# Patient Record
Sex: MALE | Race: WHITE | NOT HISPANIC OR LATINO | Employment: OTHER | URBAN - METROPOLITAN AREA
[De-identification: names, ages, dates, MRNs, and addresses within clinical notes are randomized per-mention and may not be internally consistent; named-entity substitution may affect disease eponyms.]

---

## 2019-10-15 ENCOUNTER — OFFICE VISIT (OUTPATIENT)
Dept: URGENT CARE | Facility: CLINIC | Age: 29
End: 2019-10-15
Payer: COMMERCIAL

## 2019-10-15 VITALS
TEMPERATURE: 97.4 F | WEIGHT: 194 LBS | DIASTOLIC BLOOD PRESSURE: 84 MMHG | BODY MASS INDEX: 26.28 KG/M2 | OXYGEN SATURATION: 99 % | RESPIRATION RATE: 16 BRPM | SYSTOLIC BLOOD PRESSURE: 126 MMHG | HEIGHT: 72 IN | HEART RATE: 71 BPM

## 2019-10-15 DIAGNOSIS — M62.838 MUSCLE SPASMS OF NECK: ICD-10-CM

## 2019-10-15 DIAGNOSIS — M54.2 NECK PAIN: ICD-10-CM

## 2019-10-15 PROCEDURE — 99204 OFFICE O/P NEW MOD 45 MIN: CPT | Performed by: PHYSICIAN ASSISTANT

## 2019-10-15 RX ORDER — KETOROLAC TROMETHAMINE 30 MG/ML
30 INJECTION, SOLUTION INTRAMUSCULAR; INTRAVENOUS ONCE
Status: COMPLETED | OUTPATIENT
Start: 2019-10-15 | End: 2019-10-15

## 2019-10-15 RX ORDER — CYCLOBENZAPRINE HCL 5 MG
5-10 TABLET ORAL NIGHTLY PRN
Qty: 15 TAB | Refills: 0 | Status: SHIPPED
Start: 2019-10-15 | End: 2020-05-06

## 2019-10-15 RX ADMIN — KETOROLAC TROMETHAMINE 30 MG: 30 INJECTION, SOLUTION INTRAMUSCULAR; INTRAVENOUS at 09:18

## 2019-10-15 ASSESSMENT — ENCOUNTER SYMPTOMS
HEADACHES: 0
NUMBNESS: 0
NECK PAIN: 1
SHORTNESS OF BREATH: 0
FEVER: 0
TINGLING: 0
SORE THROAT: 0
VOMITING: 0
COUGH: 0
EYE REDNESS: 0
NAUSEA: 0
TROUBLE SWALLOWING: 0
VISUAL CHANGE: 0
EYE DISCHARGE: 0
WEAKNESS: 0
ABDOMINAL PAIN: 0

## 2019-10-15 NOTE — PROGRESS NOTES
Subjective:      Marquise Peacock is a 29 y.o. male who presents with Neck Pain (x2 days, (R) side neck pain, limited ROm with pain. no known injury )        Neck Pain    This is a new problem. Episode onset: x 2 days. The problem occurs constantly. The problem has been gradually worsening. The pain is associated with nothing. The pain is present in the right side. The quality of the pain is described as shooting (and sharp). The pain is moderate. The symptoms are aggravated by position and twisting. The pain is same all the time. Pertinent negatives include no chest pain, fever, headaches, numbness, pain with swallowing, tingling, trouble swallowing, visual change or weakness. He has tried heat and acetaminophen for the symptoms. The treatment provided mild relief.     The patient denies injury or trauma to his neck. The patient states his neck pain is worse with movement, especially turning his head from side to side. The patient denies headache and fever. No numbness, tingling, or weakness.    PMH:  has no past medical history on file.  MEDS: No current outpatient medications on file.  ALLERGIES: No Known Allergies  SURGHX: History reviewed. No pertinent surgical history.  SOCHX:  reports that he has never smoked. He has never used smokeless tobacco. He reports that he drank alcohol. He reports that he does not use drugs.  FH: Family history was reviewed, no pertinent findings to report    Review of Systems   Constitutional: Negative for fever.   HENT: Negative for congestion, ear pain, sore throat and trouble swallowing.    Eyes: Negative for discharge and redness.   Respiratory: Negative for cough and shortness of breath.    Cardiovascular: Negative for chest pain and leg swelling.   Gastrointestinal: Negative for abdominal pain, nausea and vomiting.   Musculoskeletal: Positive for neck pain.   Skin: Negative for rash.   Neurological: Negative for tingling, weakness, numbness and headaches.   All other systems  reviewed and are negative.         Objective:     /84   Pulse 71   Temp 36.3 °C (97.4 °F) (Temporal)   Resp 16   Ht 1.829 m (6')   Wt 88 kg (194 lb)   SpO2 99%   BMI 26.31 kg/m²      Physical Exam   Constitutional: He is oriented to person, place, and time. He appears well-developed and well-nourished. No distress.   HENT:   Head: Normocephalic and atraumatic.   Right Ear: External ear normal.   Left Ear: External ear normal.   Nose: Nose normal.   Eyes: Conjunctivae and EOM are normal.   Neck: Neck supple. Muscular tenderness present. No spinous process tenderness present. No neck rigidity. Decreased range of motion present.   Cervical Spine:  Right paraspinal muscle tenderness to the cervical spine with associated muscle spasm extending over the trapezius muscle. No midline/bony tenderness.  Decreased ROM - secondary to pain  No rigidity  Neurovascular intact   Strength 5/5 - equal bilateral upper extremities   Strength 5/5  Intrinsic muscles intact   Cardiovascular: Normal rate.   Pulmonary/Chest: Effort normal.   Neurological: He is alert and oriented to person, place, and time.   Skin: Skin is warm and dry.          Progress:  Toradol 30mg IM given in clinic.   The patient reports approximately 10% improvement of his symptoms after the Toradol injection.    The patient is requesting a referral to a specialist for his current symptoms.  Will refer the patient to sports medicine for further evaluation of his current symptoms.     Assessment/Plan:     1. Neck pain  - ketorolac (TORADOL) injection 30 mg  - REFERRAL TO SPORTS MEDICINE    2. Muscle spasms of neck  - cyclobenzaprine (FLEXERIL) 5 MG tablet; Take 1-2 Tabs by mouth at bedtime as needed for Muscle Spasms.  Dispense: 15 Tab; Refill: 0  - REFERRAL TO SPORTS MEDICINE    The patient's presenting symptoms and physical exam are consistent with right-sided neck pain with associated muscle spasm.  Given the patient did not sustain any injury or  trauma to his neck, and the presence of no midline/bony tenderness on physical exam, it is unlikely the patient's symptoms are due to an acute bony process.  On physical exam, the patient had right paraspinal tenderness to the cervical spine with associated muscle spasm extending over the trapezius muscle.  This is likely the cause of the patient's symptoms.  Given the patient is currently not experiencing neck stiffness, radiation of pain, numbness, tingling, or weakness to his extremities, headache, or fever, the presence of no focal neurological deficits on physical exam, it is unlikely the patient's symptoms are due to an acute neurological/spinal cord process.  The patient was given Toradol 30 mg IM today in clinic for his current symptoms.  Will prescribe the patient Flexeril for symptom medic relief of his acute muscle spasm.  Will also refer the patient to sports medicine, as he is requesting a referral to a specialist for his current symptoms.  Recommend OTC medications and supportive care for symptomatic management.  Recommend patient follow-up with his PCP.  Discussed return precautions with the patient, he verbalized understanding    Differential diagnoses, supportive care, and indications for immediate follow-up discussed with patient.   Instructed to return to clinic or nearest emergency department for any change in condition, further concerns, or worsening of symptoms.    OTC NSAIDs for pain/discomfort  Alternate ice and heat to the affected area for symptomatic relief  Home stretches as discussed in clinic  Referral to sports medicine  Follow-up with PCP  AVS printed  Return to clinic or go to the ED if symptoms worsen or fail to improve, or if the patient should develop worsening/increasing neck pain, radiation of pain, numbness, tingling, or weakness to his extremities, paresthesias, neck stiffness/rigidity, headache, fever/chills, and/or any concerning symptoms.     Discussed plan with the  patient, and he agrees to the above.

## 2019-10-15 NOTE — PATIENT INSTRUCTIONS
Cervical Strain and Sprain Rehab  Ask your health care provider which exercises are safe for you. Do exercises exactly as told by your health care provider and adjust them as directed. It is normal to feel mild stretching, pulling, tightness, or discomfort as you do these exercises, but you should stop right away if you feel sudden pain or your pain gets worse. Do not begin these exercises until told by your health care provider.  Stretching and range of motion exercises  These exercises warm up your muscles and joints and improve the movement and flexibility of your neck. These exercises also help to relieve pain, numbness, and tingling.  Exercise A: Cervical side bend  1. Using good posture, sit on a stable chair or stand up.  2. Without moving your shoulders, slowly tilt your left / right ear to your shoulder until you feel a stretch in your neck muscles. You should be looking straight ahead.  3. Hold for __________ seconds.  4. Repeat with the other side of your neck.  Repeat __________ times. Complete this exercise __________ times a day.  Exercise B: Cervical rotation  1. Using good posture, sit on a stable chair or stand up.  2. Slowly turn your head to the side as if you are looking over your left / right shoulder.  ¨ Keep your eyes level with the ground.  ¨ Stop when you feel a stretch along the side and the back of your neck.  3. Hold for __________ seconds.  4. Repeat this by turning to your other side.  Repeat __________ times. Complete this exercise __________ times a day.  Exercise C: Thoracic extension and pectoral stretch  1. Roll a towel or a small blanket so it is about 4 inches (10 cm) in diameter.  2. Lie down on your back on a firm surface.  3. Put the towel lengthwise, under your spine in the middle of your back. It should not be not under your shoulder blades. The towel should line up with your spine from your middle back to your lower back.  4. Put your hands behind your head and let your  elbows fall out to your sides.  5. Hold for __________ seconds.  Repeat __________ times. Complete this exercise __________ times a day.  Strengthening exercises  These exercises build strength and endurance in your neck. Endurance is the ability to use your muscles for a long time, even after your muscles get tired.  Exercise D: Upper cervical flexion, isometric  1. Lie on your back with a thin pillow behind your head and a small rolled-up towel under your neck.  2. Gently tuck your chin toward your chest and nod your head down to look toward your feet. Do not lift your head off the pillow.  3. Hold for __________ seconds.  4. Release the tension slowly. Relax your neck muscles completely before you repeat this exercise.  Repeat __________ times. Complete this exercise __________ times a day.  Exercise E: Cervical extension, isometric  1. Stand about 6 inches (15 cm) away from a wall, with your back facing the wall.  2. Place a soft object, about 6-8 inches (15-20 cm) in diameter, between the back of your head and the wall. A soft object could be a small pillow, a ball, or a folded towel.  3. Gently tilt your head back and press into the soft object. Keep your jaw and forehead relaxed.  4. Hold for __________ seconds.  5. Release the tension slowly. Relax your neck muscles completely before you repeat this exercise.  Repeat __________ times. Complete this exercise __________ times a day.  Posture and body mechanics     Body mechanics refers to the movements and positions of your body while you do your daily activities. Posture is part of body mechanics. Good posture and healthy body mechanics can help to relieve stress in your body's tissues and joints. Good posture means that your spine is in its natural S-curve position (your spine is neutral), your shoulders are pulled back slightly, and your head is not tipped forward. The following are general guidelines for applying improved posture and body mechanics to your  everyday activities.  Standing  · When standing, keep your spine neutral and keep your feet about hip-width apart. Keep a slight bend in your knees. Your ears, shoulders, and hips should line up.  · When you do a task in which you  one place for a long time, place one foot up on a stable object that is 2-4 inches (5-10 cm) high, such as a footstool. This helps keep your spine neutral.  Sitting  · When sitting, keep your spine neutral and your keep feet flat on the floor. Use a footrest, if necessary, and keep your thighs parallel to the floor. Avoid rounding your shoulders, and avoid tilting your head forward.  · When working at a desk or a computer, keep your desk at a height where your hands are slightly lower than your elbows. Slide your chair under your desk so you are close enough to maintain good posture.  · When working at a computer, place your monitor at a height where you are looking straight ahead and you do not have to tilt your head forward or downward to look at the screen.  Resting  When lying down and resting, avoid positions that are most painful for you. Try to support your neck in a neutral position. You can use a contour pillow or a small rolled-up towel. Your pillow should support your neck but not push on it.  This information is not intended to replace advice given to you by your health care provider. Make sure you discuss any questions you have with your health care provider.  Document Released: 12/18/2006 Document Revised: 08/24/2017 Document Reviewed: 11/23/2016  [employer] Interactive Patient Education © 2017 [employer]

## 2019-10-21 ENCOUNTER — HOSPITAL ENCOUNTER (OUTPATIENT)
Dept: LAB | Facility: MEDICAL CENTER | Age: 29
End: 2019-10-21
Attending: STUDENT IN AN ORGANIZED HEALTH CARE EDUCATION/TRAINING PROGRAM
Payer: COMMERCIAL

## 2019-10-21 LAB
BASOPHILS # BLD AUTO: 0.3 % (ref 0–1.8)
BASOPHILS # BLD: 0.03 K/UL (ref 0–0.12)
CREAT UR-MCNC: 217.8 MG/DL
EOSINOPHIL # BLD AUTO: 0.02 K/UL (ref 0–0.51)
EOSINOPHIL NFR BLD: 0.2 % (ref 0–6.9)
ERYTHROCYTE [DISTWIDTH] IN BLOOD BY AUTOMATED COUNT: 41.4 FL (ref 35.9–50)
HCT VFR BLD AUTO: 47.2 % (ref 42–52)
HGB BLD-MCNC: 15.5 G/DL (ref 14–18)
IMM GRANULOCYTES # BLD AUTO: 0.03 K/UL (ref 0–0.11)
IMM GRANULOCYTES NFR BLD AUTO: 0.3 % (ref 0–0.9)
LYMPHOCYTES # BLD AUTO: 2.07 K/UL (ref 1–4.8)
LYMPHOCYTES NFR BLD: 22.5 % (ref 22–41)
MCH RBC QN AUTO: 31.2 PG (ref 27–33)
MCHC RBC AUTO-ENTMCNC: 32.8 G/DL (ref 33.7–35.3)
MCV RBC AUTO: 95 FL (ref 81.4–97.8)
MICROALBUMIN UR-MCNC: 1.3 MG/DL
MICROALBUMIN/CREAT UR: 6 MG/G (ref 0–30)
MONOCYTES # BLD AUTO: 0.69 K/UL (ref 0–0.85)
MONOCYTES NFR BLD AUTO: 7.5 % (ref 0–13.4)
NEUTROPHILS # BLD AUTO: 6.36 K/UL (ref 1.82–7.42)
NEUTROPHILS NFR BLD: 69.2 % (ref 44–72)
NRBC # BLD AUTO: 0 K/UL
NRBC BLD-RTO: 0 /100 WBC
PLATELET # BLD AUTO: 281 K/UL (ref 164–446)
PMV BLD AUTO: 10.3 FL (ref 9–12.9)
RBC # BLD AUTO: 4.97 M/UL (ref 4.7–6.1)
WBC # BLD AUTO: 9.2 K/UL (ref 4.8–10.8)

## 2019-10-21 PROCEDURE — 80053 COMPREHEN METABOLIC PANEL: CPT

## 2019-10-21 PROCEDURE — 83036 HEMOGLOBIN GLYCOSYLATED A1C: CPT

## 2019-10-21 PROCEDURE — 82570 ASSAY OF URINE CREATININE: CPT

## 2019-10-21 PROCEDURE — 36415 COLL VENOUS BLD VENIPUNCTURE: CPT

## 2019-10-21 PROCEDURE — 85025 COMPLETE CBC W/AUTO DIFF WBC: CPT

## 2019-10-21 PROCEDURE — 82043 UR ALBUMIN QUANTITATIVE: CPT

## 2019-10-21 PROCEDURE — 80061 LIPID PANEL: CPT

## 2019-10-22 LAB
ALBUMIN SERPL BCP-MCNC: 4.3 G/DL (ref 3.2–4.9)
ALBUMIN/GLOB SERPL: 1.6 G/DL
ALP SERPL-CCNC: 74 U/L (ref 30–99)
ALT SERPL-CCNC: 12 U/L (ref 2–50)
ANION GAP SERPL CALC-SCNC: 7 MMOL/L (ref 0–11.9)
AST SERPL-CCNC: 16 U/L (ref 12–45)
BILIRUB SERPL-MCNC: 0.7 MG/DL (ref 0.1–1.5)
BUN SERPL-MCNC: 20 MG/DL (ref 8–22)
CALCIUM SERPL-MCNC: 9.3 MG/DL (ref 8.5–10.5)
CHLORIDE SERPL-SCNC: 106 MMOL/L (ref 96–112)
CHOLEST SERPL-MCNC: 186 MG/DL (ref 100–199)
CO2 SERPL-SCNC: 28 MMOL/L (ref 20–33)
CREAT SERPL-MCNC: 0.71 MG/DL (ref 0.5–1.4)
EST. AVERAGE GLUCOSE BLD GHB EST-MCNC: 154 MG/DL
GLOBULIN SER CALC-MCNC: 2.7 G/DL (ref 1.9–3.5)
GLUCOSE SERPL-MCNC: 54 MG/DL (ref 65–99)
HBA1C MFR BLD: 7 % (ref 0–5.6)
HDLC SERPL-MCNC: 57 MG/DL
LDLC SERPL CALC-MCNC: 121 MG/DL
POTASSIUM SERPL-SCNC: 3.9 MMOL/L (ref 3.6–5.5)
PROT SERPL-MCNC: 7 G/DL (ref 6–8.2)
SODIUM SERPL-SCNC: 141 MMOL/L (ref 135–145)
TRIGL SERPL-MCNC: 40 MG/DL (ref 0–149)

## 2019-10-27 ENCOUNTER — HOSPITAL ENCOUNTER (OUTPATIENT)
Dept: RADIOLOGY | Facility: MEDICAL CENTER | Age: 29
End: 2019-10-27
Attending: NEUROLOGICAL SURGERY
Payer: COMMERCIAL

## 2019-10-27 DIAGNOSIS — M50.30 OTHER CERVICAL DISC DEGENERATION, UNSPECIFIED CERVICAL REGION: ICD-10-CM

## 2019-10-27 PROCEDURE — 72141 MRI NECK SPINE W/O DYE: CPT

## 2020-01-07 ENCOUNTER — HOSPITAL ENCOUNTER (OUTPATIENT)
Dept: RADIOLOGY | Facility: MEDICAL CENTER | Age: 30
End: 2020-01-07
Attending: NEUROLOGICAL SURGERY
Payer: COMMERCIAL

## 2020-01-07 DIAGNOSIS — M50.20 DISPLACEMENT OF CERVICAL INTERVERTEBRAL DISC WITHOUT MYELOPATHY: ICD-10-CM

## 2020-01-07 DIAGNOSIS — M51.34 DEGENERATION OF THORACIC INTERVERTEBRAL DISC: ICD-10-CM

## 2020-01-07 PROCEDURE — 72141 MRI NECK SPINE W/O DYE: CPT

## 2020-01-07 PROCEDURE — 72146 MRI CHEST SPINE W/O DYE: CPT

## 2020-05-06 ENCOUNTER — HOSPITAL ENCOUNTER (EMERGENCY)
Facility: MEDICAL CENTER | Age: 30
End: 2020-05-06
Attending: EMERGENCY MEDICINE
Payer: COMMERCIAL

## 2020-05-06 VITALS
HEART RATE: 86 BPM | HEIGHT: 70 IN | RESPIRATION RATE: 15 BRPM | TEMPERATURE: 98 F | SYSTOLIC BLOOD PRESSURE: 127 MMHG | DIASTOLIC BLOOD PRESSURE: 61 MMHG | WEIGHT: 194 LBS | OXYGEN SATURATION: 96 % | BODY MASS INDEX: 27.77 KG/M2

## 2020-05-06 DIAGNOSIS — E16.2 HYPOGLYCEMIA: ICD-10-CM

## 2020-05-06 LAB — GLUCOSE BLD-MCNC: 84 MG/DL (ref 65–99)

## 2020-05-06 PROCEDURE — 99282 EMERGENCY DEPT VISIT SF MDM: CPT

## 2020-05-06 PROCEDURE — 82962 GLUCOSE BLOOD TEST: CPT

## 2020-05-06 PROCEDURE — 36415 COLL VENOUS BLD VENIPUNCTURE: CPT

## 2020-05-06 RX ORDER — INSULIN GLARGINE 300 U/ML
50 INJECTION, SOLUTION SUBCUTANEOUS
COMMUNITY
End: 2021-07-06 | Stop reason: SDUPTHER

## 2020-05-06 ASSESSMENT — FIBROSIS 4 INDEX: FIB4 SCORE: 0.46

## 2020-05-06 ASSESSMENT — LIFESTYLE VARIABLES
HAVE YOU EVER FELT YOU SHOULD CUT DOWN ON YOUR DRINKING: NO
EVER FELT BAD OR GUILTY ABOUT YOUR DRINKING: NO
TOTAL SCORE: 0
ON A TYPICAL DAY WHEN YOU DRINK ALCOHOL HOW MANY DRINKS DO YOU HAVE: 0
DO YOU DRINK ALCOHOL: NO
DOES PATIENT WANT TO STOP DRINKING: NO
EVER HAD A DRINK FIRST THING IN THE MORNING TO STEADY YOUR NERVES TO GET RID OF A HANGOVER: NO
TOTAL SCORE: 0
CONSUMPTION TOTAL: NEGATIVE
HOW MANY TIMES IN THE PAST YEAR HAVE YOU HAD 5 OR MORE DRINKS IN A DAY: 0
HAVE PEOPLE ANNOYED YOU BY CRITICIZING YOUR DRINKING: NO
AVERAGE NUMBER OF DAYS PER WEEK YOU HAVE A DRINK CONTAINING ALCOHOL: 0
TOTAL SCORE: 0

## 2020-05-06 NOTE — ED PROVIDER NOTES
ED Provider Note    CHIEF COMPLAINT  Chief Complaint   Patient presents with   • Hypoglycemia     Pt presents to the ED [facility] complaints of low blood sugar. Pt states he accidently took 45 units of his long acting insulin earlier this eveining. pt states he began to notice a quick drop in his blood sugar and has called [facility] twice. Pt states he has recieved 2 bags of D10 but is continuing to notice a drop. Pt states he feels intermittitly lighheaded. BS in Triage 84       HPI  Marquise Peacock is a 29 y.o. male with a history of type 1 diabetes mellitus who presents with a chief complaint of hypoglycemia.  The patient notes he was in his baseline state of health until he accidentally gave himself 45 units of his short acting insulin, Apidra, at approximately 10:30 PM.  He realized his mistake immediately and checked his blood sugar noting at that time that it had dropped precipitously.  He called [facility] and they checked his blood glucose and gave him D10.  They left but he called them once again 10 minutes later when his blood sugar began to drop and they gave him a second bag of D10 and brought him to the ER.  He denies any current symptoms.  He has a continuous blood sugar monitor and notes that it has been at approximately 82 for the past 10 minutes which is good for him.  He notes that he was not trying to harm himself.    REVIEW OF SYSTEMS  See [facility] for further details.  Hyperglycemia all other systems are negative.     PAST MEDICAL HISTORY   has a past medical history of Diabetes (HCC).    SOCIAL HISTORY  Social History     Tobacco Use   • Smoking status: Never Smoker   • Smokeless tobacco: Never Used   Substance and Sexual Activity   • Alcohol use: Not Currently   • Drug use: Never   • Sexual activity: Not on file       SURGICAL HISTORY  patient denies any surgical history    CURRENT MEDICATIONS  Home Medications     Reviewed by Ting Humphreys R.N. (Registered Nurse) on 05/06/20 at 0043  Med List Status:  "Partial   Medication Last Dose Status   Insulin Aspart (NOVOLOG SC) 5/6/2020 Active   Insulin Glargine, 2 Unit Dial, (TOUJEO MAX SOLOSTAR) 300 UNIT/ML Solution Pen-injector 5/6/2020 Active                ALLERGIES  No Known Allergies    PHYSICAL EXAM  VITAL SIGNS: /78   Pulse 90   Temp 36.7 °C (98 °F) (Temporal)   Resp 12   Ht 1.778 m (5' 10\")   Wt 88 kg (194 lb)   SpO2 97%   BMI 27.84 kg/m²    Pulse ox interpretation: I interpret this pulse ox as normal.  Constitutional: Alert in no apparent distress.  HENT: No signs of trauma, Bilateral external ears normal, Nose normal.  Moist mucous membranes.  Eyes: Pupils are equal and reactive, Conjunctiva normal, Non-icteric.   Neck: Normal range of motion, No tenderness, Supple, No stridor.   Lymphatic: No lymphadenopathy noted.   Cardiovascular: Regular rate and rhythm, no murmurs.   Thorax & Lungs: Normal breath sounds, No respiratory distress, No wheezing, No chest tenderness.   Abdomen: Bowel sounds normal, Soft, No tenderness, No masses, No pulsatile masses. No peritoneal signs.  Skin: Warm, Dry, No erythema, No rash.   Back: Normal alignment.  Extremities: Intact distal pulses, No edema, No tenderness, No cyanosis.  Musculoskeletal: Good range of motion in all major joints. No tenderness to palpation or major deformities noted.   Neurologic: Alert, Normal motor function, Normal sensory function, No focal deficits noted.   Psychiatric: Affect normal, Judgment normal, Mood normal.     DIAGNOSTIC STUDIES / PROCEDURES    COURSE & MEDICAL DECISION MAKING  Pertinent Labs & Imaging studies reviewed. (See chart for details)  This is a 29-year-old male with a history of type 1 diabetes mellitus who is here after accidentally mixing up his short and long-term insulin and giving himself too much short acting insulin.  He gave this to himself at approximately 10:00 PM.  I did speak with our pharmacist and it appears the peak action of the medication is at around 2.8 " hours.  He is currently asymptomatic.    Patient was observed until approximately 130, 3-1/2 hours after accidentally giving himself the short acting insulin.  His blood glucose was at approximately 90.  He continued to be asymptomatic.  He is safe for discharge with close outpatient management.    The patient will not drink alcohol nor drive with prescribed medications. The patient will return for worsening symptoms and is stable at the time of discharge. The patient verbalizes understanding and will comply.    FINAL IMPRESSION  1.  Hypoglycemia    Electronically signed by: Jacky Phan M.D., 5/6/2020 1:12 AM

## 2020-05-06 NOTE — DISCHARGE INSTRUCTIONS
You were seen in the ER after accidentally taking too much of your short acting insulin.  You were monitored for several hours and your blood glucose has remained in a normal range.  You are now safe to go.  Follow-up with your primary care physician within 48 hours for recheck.  Return to the ER at anytime with new or worsening symptoms.

## 2020-05-06 NOTE — ED NOTES
Patient states that he has been 90 for the last 45 min and he feels good about going home. Notified ERP

## 2020-05-06 NOTE — ED TRIAGE NOTES
Marquise Peacock  29 y.o. male  Chief Complaint   Patient presents with   • Hypoglycemia     Pt presents to the ED [facility] complaints of low blood sugar. Pt states he accidently took 45 units of his long acting insulin earlier this eveining. pt states he began to notice a quick drop in his blood sugar and has called [facility] twice. Pt states he has recieved 2 bags of D10 but is continuing to notice a drop. Pt states he feels intermittitly lighheaded. BS in Triage 84     Pt roomed to RD3; Report given to Ting ARDON.

## 2020-05-06 NOTE — ED NOTES
Patient ambulated back to room, took 45 units novolog at approximately 2230, called EMS immediately, was given 2 bags if D10. Has a continuous BS monitor on and it is reading 84.

## 2020-08-08 ENCOUNTER — OFFICE VISIT (OUTPATIENT)
Dept: URGENT CARE | Facility: CLINIC | Age: 30
End: 2020-08-08
Payer: COMMERCIAL

## 2020-08-08 ENCOUNTER — HOSPITAL ENCOUNTER (OUTPATIENT)
Facility: MEDICAL CENTER | Age: 30
End: 2020-08-08
Attending: NURSE PRACTITIONER
Payer: COMMERCIAL

## 2020-08-08 VITALS
SYSTOLIC BLOOD PRESSURE: 122 MMHG | WEIGHT: 195 LBS | HEART RATE: 83 BPM | BODY MASS INDEX: 27.3 KG/M2 | TEMPERATURE: 99.9 F | OXYGEN SATURATION: 95 % | HEIGHT: 71 IN | DIASTOLIC BLOOD PRESSURE: 90 MMHG

## 2020-08-08 DIAGNOSIS — R51.9 ACUTE NONINTRACTABLE HEADACHE, UNSPECIFIED HEADACHE TYPE: ICD-10-CM

## 2020-08-08 DIAGNOSIS — M79.10 MYALGIA: ICD-10-CM

## 2020-08-08 DIAGNOSIS — R50.9 FEVER, UNSPECIFIED FEVER CAUSE: ICD-10-CM

## 2020-08-08 DIAGNOSIS — Z20.822 CLOSE EXPOSURE TO COVID-19 VIRUS: ICD-10-CM

## 2020-08-08 PROCEDURE — U0003 INFECTIOUS AGENT DETECTION BY NUCLEIC ACID (DNA OR RNA); SEVERE ACUTE RESPIRATORY SYNDROME CORONAVIRUS 2 (SARS-COV-2) (CORONAVIRUS DISEASE [COVID-19]), AMPLIFIED PROBE TECHNIQUE, MAKING USE OF HIGH THROUGHPUT TECHNOLOGIES AS DESCRIBED BY CMS-2020-01-R: HCPCS

## 2020-08-08 PROCEDURE — 99213 OFFICE O/P EST LOW 20 MIN: CPT | Mod: CS | Performed by: NURSE PRACTITIONER

## 2020-08-08 ASSESSMENT — ENCOUNTER SYMPTOMS
NECK PAIN: 0
TROUBLE SWALLOWING: 1
SWOLLEN GLANDS: 1
VOMITING: 0
SHORTNESS OF BREATH: 0
WEAKNESS: 0
COUGH: 0
SORE THROAT: 1
MYALGIAS: 1
ORTHOPNEA: 0
FEVER: 1
HEADACHES: 1
NAUSEA: 0
CHILLS: 0
ABDOMINAL PAIN: 0
STRIDOR: 0

## 2020-08-08 ASSESSMENT — FIBROSIS 4 INDEX: FIB4 SCORE: 0.48

## 2020-08-08 NOTE — PROGRESS NOTES
Subjective:   Marquise Peacock is a 30 y.o. male who presents for Pharyngitis (body aches, fever, headache)       Pharyngitis   This is a new problem. The current episode started in the past 7 days. The problem has been waxing and waning. The maximum temperature recorded prior to his arrival was 100.4 - 100.9 F. The fever has been present for 1 to 2 days. The pain is mild. Associated symptoms include headaches, swollen glands and trouble swallowing. Pertinent negatives include no abdominal pain, congestion, coughing, neck pain, shortness of breath, stridor or vomiting. Associated symptoms comments: Myalgias. Exposure to: Covid. He has tried acetaminophen and NSAIDs for the symptoms. The treatment provided mild relief.     Pt presents for evaluation of a new problem, reports being directly related to COVID.  States his daughter's  was positive for COVID earlier last week.  States he had symptoms prior to that, but attributed them to more of a viral illness but now is concerned and wants to be evaluated for such.    Review of Systems   Constitutional: Positive for fever. Negative for chills and malaise/fatigue.   HENT: Positive for sore throat and trouble swallowing. Negative for congestion.    Respiratory: Negative for cough, shortness of breath and stridor.    Cardiovascular: Negative for chest pain, orthopnea and leg swelling.   Gastrointestinal: Negative for abdominal pain, nausea and vomiting.   Genitourinary: Negative for dysuria.   Musculoskeletal: Positive for myalgias. Negative for neck pain.   Neurological: Positive for headaches. Negative for weakness.   All other systems reviewed and are negative.      MEDS:   Current Outpatient Medications:   •  Insulin Glargine, 2 Unit Dial, (TOUJEO MAX SOLOSTAR) 300 UNIT/ML Solution Pen-injector, Inject 50 Units as instructed every bedtime., Disp: , Rfl:   •  Insulin Aspart (NOVOLOG SC), Inject 16 Units as instructed. Dose changes with intake, Disp: ,  "Rfl:   ALLERGIES: No Known Allergies    Patient's PMH, SocHx, SurgHx, FamHx, Drug allergies and medications were reviewed.     Objective:   /90   Pulse 83   Temp 37.7 °C (99.9 °F) (Temporal)   Ht 1.803 m (5' 11\")   Wt 88.5 kg (195 lb)   SpO2 95%   BMI 27.20 kg/m²     Physical Exam  Vitals signs and nursing note reviewed.   Constitutional:       General: He is awake.      Appearance: Normal appearance. He is well-developed and normal weight.   HENT:      Head: Normocephalic and atraumatic.      Right Ear: Tympanic membrane, ear canal and external ear normal.      Left Ear: Tympanic membrane, ear canal and external ear normal.      Nose: Nose normal.      Mouth/Throat:      Mouth: Mucous membranes are moist.      Pharynx: Oropharynx is clear.   Eyes:      Extraocular Movements: Extraocular movements intact.      Conjunctiva/sclera: Conjunctivae normal.      Pupils: Pupils are equal, round, and reactive to light.   Neck:      Musculoskeletal: Full passive range of motion without pain, normal range of motion and neck supple.      Thyroid: No thyromegaly.      Trachea: Trachea normal.   Cardiovascular:      Rate and Rhythm: Normal rate and regular rhythm.      Pulses: Normal pulses.      Heart sounds: Normal heart sounds, S1 normal and S2 normal.   Pulmonary:      Effort: Pulmonary effort is normal. No respiratory distress.      Breath sounds: Normal breath sounds. No wheezing, rhonchi or rales.   Abdominal:      General: Bowel sounds are normal.      Palpations: Abdomen is soft.   Musculoskeletal: Normal range of motion.   Lymphadenopathy:      Cervical: No cervical adenopathy.   Skin:     General: Skin is warm and dry.      Capillary Refill: Capillary refill takes less than 2 seconds.   Neurological:      General: No focal deficit present.      Mental Status: He is alert and oriented to person, place, and time.      Gait: Gait is intact.   Psychiatric:         Attention and Perception: Attention and " perception normal.         Mood and Affect: Mood normal.         Speech: Speech normal.         Behavior: Behavior normal. Behavior is cooperative.         Thought Content: Thought content normal.         Judgment: Judgment normal.         Assessment/Plan:   Assessment    1. Close exposure to COVID-19 virus  - COVID/SARS COV-2 PCR; Future    2. Fever, unspecified fever cause  - COVID/SARS COV-2 PCR; Future    3. Myalgia  - COVID/SARS COV-2 PCR; Future    4. Acute nonintractable headache, unspecified headache type  - COVID/SARS COV-2 PCR; Future    Tested for COVID today, advised to self quarantine until test results return.  Also discussed the need for his wife and daughter to self quarantine as well due to close proximity of positive COVID patient.  Supportive care options also discussed.  Differential diagnosis, natural history, and indications for immediate follow-up were discussed.     Return to urgent care clinic or PCP if current symptoms do not improve and/or worsening symptoms occur. Advised of signs and symptoms which would warrant further evaluation and /or emergent evaluation in ER.  All questions answered and the patient agrees to the plan of care.

## 2020-08-09 DIAGNOSIS — R50.9 FEVER, UNSPECIFIED FEVER CAUSE: ICD-10-CM

## 2020-08-09 DIAGNOSIS — R51.9 ACUTE NONINTRACTABLE HEADACHE, UNSPECIFIED HEADACHE TYPE: ICD-10-CM

## 2020-08-09 DIAGNOSIS — M79.10 MYALGIA: ICD-10-CM

## 2020-08-09 DIAGNOSIS — Z20.822 CLOSE EXPOSURE TO COVID-19 VIRUS: ICD-10-CM

## 2020-08-09 LAB — COVID ORDER STATUS COVID19: NORMAL

## 2020-08-10 ENCOUNTER — TELEPHONE (OUTPATIENT)
Dept: URGENT CARE | Facility: CLINIC | Age: 30
End: 2020-08-10

## 2020-08-10 ENCOUNTER — PATIENT MESSAGE (OUTPATIENT)
Dept: URGENT CARE | Facility: CLINIC | Age: 30
End: 2020-08-10

## 2020-08-10 LAB
SARS-COV-2 RNA RESP QL NAA+PROBE: DETECTED
SPECIMEN SOURCE: ABNORMAL

## 2020-08-11 ENCOUNTER — TELEPHONE (OUTPATIENT)
Dept: URGENT CARE | Facility: CLINIC | Age: 30
End: 2020-08-11

## 2020-08-11 NOTE — TELEPHONE ENCOUNTER
----- Message from [facility] sent at 8/10/2020  8:23 AM PDT -----  Regarding: FW: Test Result Question  Contact: [phone]    ----- Message -----  From: Marquise Peacock  Sent: 8/10/2020   8:03 AM PDT  To: [facility]  Message Pool  Subject: Test Result Question                             Hi,    Any update on the status of my COVID results?    My symptoms are going away and I'm feeling much better - hoping that the results are negative but if positive, signs are going away.    Please call me as soon as you have results.    [phone]

## 2021-07-06 ENCOUNTER — OFFICE VISIT (OUTPATIENT)
Dept: MEDICAL GROUP | Facility: LAB | Age: 31
End: 2021-07-06
Payer: COMMERCIAL

## 2021-07-06 VITALS
BODY MASS INDEX: 28.56 KG/M2 | SYSTOLIC BLOOD PRESSURE: 124 MMHG | DIASTOLIC BLOOD PRESSURE: 82 MMHG | OXYGEN SATURATION: 96 % | RESPIRATION RATE: 12 BRPM | HEIGHT: 71 IN | TEMPERATURE: 98.7 F | WEIGHT: 204 LBS | HEART RATE: 66 BPM

## 2021-07-06 DIAGNOSIS — E10.9 TYPE 1 DIABETES MELLITUS WITHOUT COMPLICATION (HCC): ICD-10-CM

## 2021-07-06 PROCEDURE — 99215 OFFICE O/P EST HI 40 MIN: CPT | Performed by: FAMILY MEDICINE

## 2021-07-06 RX ORDER — BLOOD-GLUCOSE METER
KIT MISCELLANEOUS
COMMUNITY
Start: 2021-04-17 | End: 2022-06-30 | Stop reason: SDUPTHER

## 2021-07-06 RX ORDER — PEN NEEDLE, DIABETIC 32 GX 1/4"
NEEDLE, DISPOSABLE MISCELLANEOUS
COMMUNITY
Start: 2021-06-29

## 2021-07-06 RX ORDER — INSULIN GLARGINE 300 U/ML
70-100 INJECTION, SOLUTION SUBCUTANEOUS
Qty: 30 ML | Refills: 5 | Status: SHIPPED | OUTPATIENT
Start: 2021-07-06 | End: 2022-08-16

## 2021-07-06 RX ORDER — INSULIN LISPRO 100 [IU]/ML
INJECTION, SOLUTION INTRAVENOUS; SUBCUTANEOUS
COMMUNITY
Start: 2021-06-10 | End: 2021-07-06

## 2021-07-06 ASSESSMENT — FIBROSIS 4 INDEX: FIB4 SCORE: 0.49

## 2021-07-06 ASSESSMENT — PATIENT HEALTH QUESTIONNAIRE - PHQ9: CLINICAL INTERPRETATION OF PHQ2 SCORE: 0

## 2021-07-06 NOTE — PROGRESS NOTES
"Marquise Peacock is a 31 y.o. male here to establish care and discuss Type 1 diabetes.    HPI:      Type 1 DM  Diagnosed at 11 yo and has been well controlled recently.  Last A1c 6.2.  Follows with endocrinologist but has not been able to follow-up with them and they may have close their office.  Has CGM. Uses Toujeo  units nightly. Humalog or novolog 5-7x daily ~ 10U depending on activity level, 1U per 7-8 carbs. Does have occasional lows 40-50 at times, quickly drinks juice    He just had comprehensive labs done through life insurance and does get eye exams once yearly.      Current medicines (including changes today)  Current Outpatient Medications   Medication Sig Dispense Refill   • HUMALOG KWIKPEN 100 UNIT/ML Solution Pen-injector injection PEN      • BD PEN NEEDLE MICRO U/F 32G X 6 MM Misc      • FREESTYLE LITE strip USE TO TEST BLOOD SUGAR 7 8 TIMES DAILY     • Insulin Glargine, 2 Unit Dial, (TOUJEO MAX SOLOSTAR) 300 UNIT/ML Solution Pen-injector Inject 50 Units as instructed every bedtime.     • Insulin Aspart (NOVOLOG SC) Inject 16 Units as instructed. Dose changes with intake       No current facility-administered medications for this visit.     He  has a past medical history of Diabetes (HCC).  He  has no past surgical history on file.  Social History     Tobacco Use   • Smoking status: Never Smoker   • Smokeless tobacco: Never Used   Vaping Use   • Vaping Use: Never used   Substance Use Topics   • Alcohol use: Not Currently   • Drug use: Never     Social History     Social History Narrative   • Not on file     History reviewed. No pertinent family history.  No family status information on file.       ROS  See HPI     Objective:     Physical Exam:  /82 (BP Location: Right arm, Patient Position: Sitting, BP Cuff Size: Adult)   Pulse 66   Temp 37.1 °C (98.7 °F)   Resp 12   Ht 1.803 m (5' 11\")   Wt 92.5 kg (204 lb)   SpO2 96%  Body mass index is 28.45 kg/m².  Constitutional: Alert. " Well appearing. No distress.  Skin: Warm, dry, good turgor, no visible rashes.  Eye: Equal, round and reactive to light, conjunctiva clear, lids normal.  ENMT: Moist mucous membranes..  Neck: Trachea midline, no masses, no thyromegaly.   Respiratory: Normal effort. Lungs are clear to auscultation bilaterally.  Cardiovascular: Regular rate and rhythm. Normal S1/S2. No murmurs, rubs or gallops.   Monofilament testing with a 10 gram force: sensation intact: intact bilaterally  Visual Inspection: Feet without maceration, ulcers, fissures.  Pedal pulses: intact bilaterally  Neuro: Moves all four extremities. No facial droop.  Psych: Answers questions appropriately. Normal affect and mood.    Assessment and Plan:     1. Type 1 diabetes mellitus without complication (HCC)  Well controlled, reports most recent A1c 6.2%.  Referral was placed to establish with new endocrinologist.  He is very educated on diabetes control and a semi-autonomous with his management.  He does have occasional lows but addresses accordingly.  No changes to insulin regimen today.  - insulin aspart (NOVOLOG) 100 UNIT/ML Solution; Use as directed, up to 70U daily  Dispense: 30 mL; Refill: 5  - Insulin Glargine, 2 Unit Dial, (TOUJEO MAX SOLOSTAR) 300 UNIT/ML Solution Pen-injector; Inject  Units under the skin at bedtime.  Dispense: 30 mL; Refill: 5  - REFERRAL TO ENDOCRINOLOGY  - REFERRAL TO OPHTHALMOLOGY    Follow up: Return in about 1 year (around 7/6/2022), or if symptoms worsen or fail to improve.         PLEASE NOTE: This note was created using voice recognition software.

## 2021-07-07 ENCOUNTER — TELEPHONE (OUTPATIENT)
Dept: MEDICAL GROUP | Facility: LAB | Age: 31
End: 2021-07-07

## 2021-07-07 NOTE — TELEPHONE ENCOUNTER
DOCUMENTATION OF PAR STATUS:    1. Name of Medication & Dose: TOUJEO MAX SOLOSTAR     2. Name of Prescription Coverage Company & phone #: [employer]    3. Date Prior Auth Submitted: 7/7/2021    4. What information was given to obtain insurance decision? Faxed ov notes    5. Prior Auth Status? Current PA valid until 8/13/2021    6. Patient Notified: no

## 2021-07-07 NOTE — TELEPHONE ENCOUNTER
DOCUMENTATION OF PAR STATUS:    1. Name of Medication & Dose: insulin aspart     2. Name of Prescription Coverage Company & phone #: cover my meds    3. Date Prior Auth Submitted: 7/7/2021    4. What information was given to obtain insurance decision? Faxed ov notes    5. Prior Auth Status?   The case has been cancelled/closed by health plan/payer/processor/PBM.    6. Patient Notified: no

## 2021-08-24 ENCOUNTER — TELEPHONE (OUTPATIENT)
Dept: MEDICAL GROUP | Facility: LAB | Age: 31
End: 2021-08-24

## 2021-08-24 NOTE — TELEPHONE ENCOUNTER
MEDICATION PRIOR AUTHORIZATION NEEDED:    1. Name of Medication: Insulin Glargine, 2 Unit Dial, (TOUJEO MAX SOLOSTAR) 300 UNIT/ML Solution Pen-injector    2. Requested By (Name of Pharmacy): [facility] [place]  P: [phone]  F: [phone]     3. Is insurance on file current? yes    4. What is the name & phone number of the 3rd party payor?   [employer]uan[employer]   P; [phone]    Prior auth submitted to plan through CoverMeds, waiting for possible approval.   Key: [id number]  Case ID: [id number]

## 2021-08-26 ENCOUNTER — OFFICE VISIT (OUTPATIENT)
Dept: MEDICAL GROUP | Facility: LAB | Age: 31
End: 2021-08-26
Payer: COMMERCIAL

## 2021-08-26 ENCOUNTER — TELEPHONE (OUTPATIENT)
Dept: MEDICAL GROUP | Facility: LAB | Age: 31
End: 2021-08-26

## 2021-08-26 VITALS
SYSTOLIC BLOOD PRESSURE: 122 MMHG | OXYGEN SATURATION: 99 % | HEIGHT: 71 IN | TEMPERATURE: 97.5 F | WEIGHT: 200 LBS | RESPIRATION RATE: 12 BRPM | BODY MASS INDEX: 28 KG/M2 | HEART RATE: 84 BPM | DIASTOLIC BLOOD PRESSURE: 80 MMHG

## 2021-08-26 DIAGNOSIS — R41.840 ATTENTION AND CONCENTRATION DEFICIT: ICD-10-CM

## 2021-08-26 DIAGNOSIS — E10.9 TYPE 1 DIABETES MELLITUS WITHOUT COMPLICATION (HCC): ICD-10-CM

## 2021-08-26 PROCEDURE — 99214 OFFICE O/P EST MOD 30 MIN: CPT | Performed by: FAMILY MEDICINE

## 2021-08-26 RX ORDER — INSULIN ASPART 100 [IU]/ML
INJECTION, SOLUTION INTRAVENOUS; SUBCUTANEOUS
Qty: 30 ML | Refills: 5 | Status: SHIPPED | OUTPATIENT
Start: 2021-08-26 | End: 2022-09-10 | Stop reason: SDUPTHER

## 2021-08-26 ASSESSMENT — ANXIETY QUESTIONNAIRES
6. BECOMING EASILY ANNOYED OR IRRITABLE: MORE THAN HALF THE DAYS
7. FEELING AFRAID AS IF SOMETHING AWFUL MIGHT HAPPEN: NOT AT ALL
4. TROUBLE RELAXING: SEVERAL DAYS
3. WORRYING TOO MUCH ABOUT DIFFERENT THINGS: SEVERAL DAYS
5. BEING SO RESTLESS THAT IT IS HARD TO SIT STILL: SEVERAL DAYS
GAD7 TOTAL SCORE: 6
1. FEELING NERVOUS, ANXIOUS, OR ON EDGE: SEVERAL DAYS
2. NOT BEING ABLE TO STOP OR CONTROL WORRYING: NOT AT ALL

## 2021-08-26 ASSESSMENT — FIBROSIS 4 INDEX: FIB4 SCORE: 0.49

## 2021-08-26 NOTE — PROGRESS NOTES
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  He has a colleague who uses Adderall for ADHD and recommended he try this, he did note significant improvement with trying this.  He thinks symptoms have definitely been present when he was younger but did well in school and never had a formal diagnosis of ADHD.  He has not necessarily noticed effects in other areas of life besides work.  Denies depression or significant anxiety.    Adult ADHD self-report scale is consistent with diagnosis of ADHD -see scanned document    FELTON-7 Questionnaire    Feeling nervous, anxious, or on edge: Several days  Not being able to sop or control worrying: Not at all  Worrying too much about different things: Several days  Trouble relaxing: Several days  Being so restless that it's hard to sit still: Several days  Becoming easily annoyed or irritable: More than half the days  Feeling afraid as if something awful might happen: Not at all  Total: 6    Interpretation of FELTON 7 Total Score   Score Severity :  0-4 No Anxiety   5-9 Mild Anxiety  10-14 Moderate Anxiety  15-21 Severe Anxiety    Medications, past medical history, allergies, and social history have been reviewed and updated.    ROS:  See HPI      Objective:       Exam:  /80 (BP Location: Right arm, Patient Position: Sitting, BP Cuff Size: Adult)   Pulse 84   Temp 36.4 °C (97.5 °F)   Resp 12   Ht 1.803 m (5' 11\")   Wt 90.7 kg (200 lb)   SpO2 99%   BMI 27.89 kg/m²  Body mass index is 27.89 kg/m².    Constitutional: Alert. Well appearing. No distress.  Skin: Warm, dry, good turgor, no visible rashes.  Eye: Equal, round and reactive to light, conjunctiva clear, lids normal.  Respiratory: Normal effort.  Neuro: Moves all four extremities. No facial droop.  Psych: Answers questions appropriately. Normal " affect and mood.      Assessment & Plan:     31 y.o. male with the following -     1. Attention and concentration deficit  Presents today with concern for possible diagnosis of ADHD.  Reports significant issues with attention and concentration with complex tasks at work.  Adult ADHD self-report scale is consistent with diagnosis.  However, given no prior diagnosis and presenting as an adult we will send referral to psychiatry for diagnostic clarification.  - REFERRAL TO PSYCHIATRY    2. Type 1 diabetes mellitus without complication (HCC)  Plans to reestablish with endocrinology.  - insulin aspart (NOVOLOG FLEXPEN) 100 UNIT/ML injection PEN; Use as directed, up to 70 units daily  Dispense: 30 mL; Refill: 5      Please note that this note was created using voice recognition software.

## 2021-08-26 NOTE — TELEPHONE ENCOUNTER
MEDICATION PRIOR AUTHORIZATION NEEDED:    1. Name of Medication: insulin aspart (NOVOLOG FLEXPEN) 100 UNIT/ML injection PEN    2. Requested By (Name of Pharmacy): [facility] [place]  P: [phone]  F: [phone]     3. Is insurance on file current? Yes    4. What is the name & phone number of the 3rd party payor?   [employer]  P: [phone]  F: [phone]    Prior auth submitted to plan through CoverMyMeds, waiting for possible approval.   Key: [id number]  Case ID: [id number]

## 2021-08-30 ENCOUNTER — TELEPHONE (OUTPATIENT)
Dept: MEDICAL GROUP | Facility: LAB | Age: 31
End: 2021-08-30

## 2021-08-30 NOTE — TELEPHONE ENCOUNTER
Agusto from [facility] working on a PA for NOVALOG. Pt's insurance covers Humalog. Please fax notes to [phone] if pt cannot use Humalog.

## 2021-09-03 ENCOUNTER — OFFICE VISIT (OUTPATIENT)
Dept: BEHAVIORAL HEALTH | Facility: CLINIC | Age: 31
End: 2021-09-03
Payer: COMMERCIAL

## 2021-09-03 ENCOUNTER — DOCUMENTATION (OUTPATIENT)
Dept: BEHAVIORAL HEALTH | Facility: CLINIC | Age: 31
End: 2021-09-03

## 2021-09-03 DIAGNOSIS — F90.9 ATTENTION DEFICIT HYPERACTIVITY DISORDER (ADHD), UNSPECIFIED ADHD TYPE: ICD-10-CM

## 2021-09-03 DIAGNOSIS — F41.9 ANXIETY: ICD-10-CM

## 2021-09-03 PROCEDURE — 99204 OFFICE O/P NEW MOD 45 MIN: CPT | Mod: GC | Performed by: PSYCHIATRY & NEUROLOGY

## 2021-09-03 RX ORDER — DEXTROAMPHETAMINE SACCHARATE, AMPHETAMINE ASPARTATE MONOHYDRATE, DEXTROAMPHETAMINE SULFATE AND AMPHETAMINE SULFATE 3.75; 3.75; 3.75; 3.75 MG/1; MG/1; MG/1; MG/1
15 CAPSULE, EXTENDED RELEASE ORAL EVERY MORNING
Qty: 15 CAPSULE | Refills: 0 | Status: SHIPPED | OUTPATIENT
Start: 2021-09-03 | End: 2021-09-17 | Stop reason: SDUPTHER

## 2021-09-03 NOTE — PROGRESS NOTES
"  INITIAL PSYCHIATRIC EVALUATION      This provider informed the patient their medical records are totally confidential except for the use by other providers involved in their care, or if the patient signs a release, or to report instances of child or elder abuse, or if it is determined they are an immediate risk to harm themselves or others.      CHIEF COMPLAINT  \"I'm worried I might have ADHD\"      HISTORY OF PRESENT ILLNESS  Marquise Peacock is a 31 y.o. old male comes in today to establish care and for evaluation of concentration.   Patient states that over the last few months he has been noticing increased difficulty with organizing his tasks and concentration. He did a ADHD self report rating scale with his primary care physician, this was reviewed with the patient. He notes primarily difficulty with remaining seated, wrapping up final details of a project, staying on task, interrupting in conversation, volume of speech. Patient states that he did well in school growing up and was always able to finish his work and get good grades. He often did have comments made about speaking too loudly or interrupting when he was in school. Most recently, he has had increased work load. He works as a  and has his own firm. He also notes that his second daughter was born 5 weeks ago and he has another daughter who is 3 years old. He states he has had difficulty with increased demands and keeping up. Patient states a friend gave him Adderall XR 30mg and he has been taking half intermittently. When he has taken the medication, he states his mind slows down and he feels he is able to organize tasks and focus. He feels his worries decrease during this time as well.   Patient does state excessive worry about task completion and what will happen. He describes feeling on edge and difficulty relaxing. He denies worrying about something bad happening. He states he has difficulty falling asleep due to having " constant thoughts.   Patient states his mood is overall good. He engages in activities he enjoys. He denies suicidal ideation, plan or intent.     PSYCHIATRIC REVIEW OF SYSTEMS: denies depressive symptoms, denies manic symptoms, denies psychotic symptoms including AH / VH, denies OCD symptoms and denies trauma related symptoms      MEDICAL REVIEW OF SYSTEMS:   Constitutional negative   Eyes negative   Ears/Nose/Mouth/Throat negative   Cardiovascular negative   Respiratory negative   Gastrointestinal negative   Genitourinary negative   Muscular negative   Integumentary negative   Neurological negative   Endocrine negative   Hematologic/Lymphatic negative     CURRENT MEDICATIONS:  Current Outpatient Medications   Medication Sig Dispense Refill   • insulin aspart (NOVOLOG FLEXPEN) 100 UNIT/ML injection PEN Use as directed, up to 70 units daily 30 mL 5   • BD PEN NEEDLE MICRO U/F 32G X 6 MM Misc      • FREESTYLE LITE strip USE TO TEST BLOOD SUGAR 7 8 TIMES DAILY     • Insulin Glargine, 2 Unit Dial, (TOUJEO MAX SOLOSTAR) 300 UNIT/ML Solution Pen-injector Inject  Units under the skin at bedtime. 30 mL 5     No current facility-administered medications for this visit.       ALLERGIES:  Patient has no known allergies.    PAST PSYCHIATRIC HISTORY  Prior psychiatric hospitalization: denies  Prior Self harm/suicide attempt: denies  Prior Diagnosis: denies     PAST PSYCHIATRIC MEDICATIONS  • Adderall XR      FAMILY HISTORY  Psychiatric diagnosis:  Patient states multiple family members take Lexapro for suspected anxiety although not known   History of suicide attempts:  Denies   Substance abuse history:  Denies     SUBSTANCE USE HISTORY:  ALCOHOL: social   TOBACCO: denies   CANNABIS: denies   OPIOIDS: denies  PRESCRIPTION MEDICATIONS: denies  OTHERS: denies  History of inpatient/outpatient rehab treatment: n/a     SOCIAL HISTORY  Childhood: born in [place] and describes childhood as good. States close relationship with  family.  Education: Masters in Accounting and Finance   Employment:   Relationship:  2 years   Kids: two children age 3 and 5 weeks   Current living situation: lives with wife and children   Current/past legal issues: denies   History of emotional/physical/sexual abuse - denies - history of trauma father paralyzed in accident when patient was 11     MEDICAL HISTORY  Past Medical History:   Diagnosis Date   • Diabetes (HCC)      No past surgical history on file.      PHYSICAL EXAMINAION:  Vital signs: There were no vitals taken for this visit.  Musculoskeletal: Normal gait.   Abnormal movements: no abnormal movements noted     MENTAL STATUS EXAMINATION      General:   - Grooming and hygiene: Casual and Neat,   - Apparent distress: no apparent distress ,   - Behavior: Calm  - Eye Contact:  Good,   - no psychomotor agitation or retardation    - Participation: Active verbal participation  Orientation: Alert and Fully Oriented to person, place and time  Mood: Euthymic  Affect: Flexible,  Thought Process: Logical and Goal-directed  Thought Content: Denies suicidal or homicidal ideations, intent or plan Within normal limits  Perception: Denies auditory or visual hallucinations. No delusions noted Within normal limits  Attention span and concentration: Intact   Speech:Rate within normal limits and Volume within normal limits  Language: Appropriate   Insight: Good  Judgment: Good  Recent and remote memory: No gross evidence of memory deficits      DEPRESSION SCREENING:  Depression Screen (PHQ-2/PHQ-9) 7/6/2021   PHQ-2 Total Score 0       Interpretation of PHQ-9 Total Score   Score Severity   1-4 No Depression   5-9 Mild Depression   10-14 Moderate Depression   15-19 Moderately Severe Depression   20-27 Severe Depression      SAFETY ASSESSMENT - SELF:    Does patient acknowledge current or past symptoms of dangerousness to self? no  History of suicide by family member: no  History of suicide by friend/significant  other: no  Recent change in amount/specificity/intensity of suicidal thoughts or self-harm behavior? no  Current access to firearms, medications, or other identified means of suicide/self-harm? no  If yes, willing to restrict access to means of suicide/self-harm?   Protective factors present: close relationship with friends and family        SAFETY ASSESSMENT - OTHERS:    Does patient acknowledge current or past symptoms of aggressive behavior or risk to others? no  Recent change in amount/specificity/intensity of thoughts or threats to harm others? no  Current access to firearms/other identified means of harm? no  If yes, willing to restrict access to weapons/means of harm?        CURRENT RISK:       Suicidal: Low       Homicidal: Low       Self-Harm: Low       Relapse: Not applicable       Crisis Safety Plan Reviewed Not Indicated    MEDICAL RECORDS/LABS/DIAGNOSTIC TESTS REVIEWED:  Reviewed       [facility] records -   Reviewed, appropriate       ASSESSMENT  Marquise Peacock is a 31 y.o. old male presenting to establish care. Patient does have symptoms that may be consistent with Attention Deficit Hyperactivity Disorder at this time. He has notable symptoms of hyperactivity as a child with continued difficulty remaining seated and following multi step tasks. Patient also has notable history of generalized anxiety disorder in the family that is suspected and is noted to have excessive worry with difficulty relaxing and frequent thoughts about tasks. In setting of recent increase in demands and stressors, it is likely that anxiety symptoms have increased. It is difficult to determine if symptoms are primarily in relation to ADHD or anxiety at this time. Patient will be referred for neuropsychological testing with trial of Adderall XR 15mg as patient has been taking half of 30 with tolerance and benefit.       DIFFERENTIAL DIAGNOSES  1. Attention Deficit Hyperactivity Disorder, unspecified  2. Unspecified Anxiety  Disorder - R/O Generalized anxiety disorder      PLAN:  • Start Adderall XR 15mg PO daily - 15 day supply provided  • Referral for neuropsychological testing  • Controlled Substance agreement reviewed and signed   • I reviewed clinical lab tests done in last 1 year.  • Medication options, alternatives (including no medications) and medication risks/benefits/side effects were discussed in detail.  • The patient was advised to call, message provider on [employer], or come in to the clinic if symptoms worsen or if any future questions/issues regarding their medications arise; the patient verbalized understanding and agreement.    • The patient was educated to call 911, call the suicide hotline, or go to local ER if having thoughts of suicide or homicide; verbalized understanding.        Return to clinic in 3 weeks or sooner if symptoms worsen.  Next Appointment:  instruction provided on how to make the next appointment.     The proposed treatment plan was discussed with the patient who was provided the opportunity to ask questions and make suggestions regarding alternative treatment. Patient verbalized understanding and expressed agreement with the plan.     Thank you for allowing me to participate in the care of this patient.    Jacqueline Escobar D.O.  09/03/21    CC:   Mahesh Fitzgerald M.D.

## 2021-09-09 ENCOUNTER — OFFICE VISIT (OUTPATIENT)
Dept: BEHAVIORAL HEALTH | Facility: CLINIC | Age: 31
End: 2021-09-09
Payer: COMMERCIAL

## 2021-09-09 DIAGNOSIS — F90.2 ADHD (ATTENTION DEFICIT HYPERACTIVITY DISORDER), COMBINED TYPE: ICD-10-CM

## 2021-09-09 PROCEDURE — 90791 PSYCH DIAGNOSTIC EVALUATION: CPT | Performed by: PSYCHOLOGIST

## 2021-09-09 PROCEDURE — 96136 PSYCL/NRPSYC TST PHY/QHP 1ST: CPT | Performed by: PSYCHOLOGIST

## 2021-09-09 PROCEDURE — 96137 PSYCL/NRPSYC TST PHY/QHP EA: CPT | Performed by: PSYCHOLOGIST

## 2021-09-09 ASSESSMENT — ANXIETY QUESTIONNAIRES
5. BEING SO RESTLESS THAT IT IS HARD TO SIT STILL: NEARLY EVERY DAY
6. BECOMING EASILY ANNOYED OR IRRITABLE: MORE THAN HALF THE DAYS
4. TROUBLE RELAXING: NEARLY EVERY DAY
3. WORRYING TOO MUCH ABOUT DIFFERENT THINGS: SEVERAL DAYS
1. FEELING NERVOUS, ANXIOUS, OR ON EDGE: SEVERAL DAYS
7. FEELING AFRAID AS IF SOMETHING AWFUL MIGHT HAPPEN: NOT AT ALL
GAD7 TOTAL SCORE: 12
2. NOT BEING ABLE TO STOP OR CONTROL WORRYING: MORE THAN HALF THE DAYS

## 2021-09-09 NOTE — PROGRESS NOTES
"  [facility]  ADD/ADHD Assessment    This evaluation was conducted face to face at [facility].  Therapist reviewed limits of confidentiality. Therapist verbally reviewed informed consent for therapy and testing. Therapist discussed risks/benefits and expectations for services. Patient provided verbal consent for psychotherapy services.       Name: Marquise Peacock  MRN: [id number]  : 1990  Age: 31 y.o.  Date of assessment: 2021  PCP: Mahesh Fitzgerald M.D.  Persons in attendance: Patient  Total session time: 45minutes        CHIEF COMPLAINT AND HISTORY OF PRESENTING PROBLEM:    Marquise Peacock is a 31 y.o., male referred by Jacqueline Escobar D. to confirm or rule out a diagnosis of ADHD. He saw his PCP first and was referred to Dr. Arevalo to help assess why he is having difficulty with some work issues as work becomes more complicated. 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  He is starting to get overwhelmed with the number of activities he has to tend to. His challenges seem more difficult than they should be. Assessed for depression and he denied tearfulness/sadness, hopelessness/worthlessness, no lack of energy/motivation/fatigue, difficulty concentrating/focusing, sleep is \"pretty good,\" appetite is \"normal.\" He is a diabetic so he is responsible with his body. No suicidal thoughts past or present. Symptoms of anxiety assessed and he denied feeling nervous or shaky, no sweatiness due to anxiety, endorses restlessness, no difficulty breathing, no tight chest, no panic attacks.     Symptoms of ADHD assessed and he endorsed the following: Does not fail to give close attention to details or careless mistakes. Says he is over-vigilant and perfectionistic almost.  He checks his sheets or forms out many times to make sure there are no mistakes. Has difficulty holding attention to certain tasks or " activities. Has difficulty following through with things or finishing things up. Gets distracted easily with other tasks. Sometimes feels like when people are talking he zones and thinks about other things, difficulty staying seated or slowing down, has difficulty organizing tasks or activities. Has multiple things going at all times, often puts off things that will require mental effort, procrastinates, does not misplace things.  Hyperactive symptoms include being fidgety, restlessness, was very talkative as a kid and got poor citizenship grades because he was so talkative, is currently talkative/excessively, does not blurt out, but does interrupt people before people are done talking, feels like he is driven by a motor, always has to be moving or doing something.       Relevant Medications  Currently prescribed Adderall which he did not take today. No other medications.     BEHAVIORAL HEALTH TREATMENT HISTORY    Does patient report a history of prior behavioral health treatment? No  When and what for?   History of untreated behavioral health issues identified? Yes    FAMILY/SOCIAL HISTORY    Marital Status:  2 years  # Of Children: 2 children ages 3 and 10 weeks old  Current living situation/household members: Lives with his wife, kids and mother temporarily.  Pets: No pets.   Family of origin history / siblings: One sister no ADHD  Current family stressors: Has a big family, has a little family issues.   Quality/quantity of current support system:  Has a lot of support, involved with Yarsani, family, extended family and grandparents.     Family History of mental health issues? Unknown  Who and what type: N/A      MEDICAL HISTORY    Current medical issues: See Medical Chart      Past medical/surgical history:   Past Medical History:   Diagnosis Date   • Diabetes (HCC)       No past surgical history on file.       NUTRITION ISSUES:    Does patient report any current nutritional concerns? No  Does patient  "report change in appetite or weight loss/gain? No  Does patient report history of eating disorder symptoms? No  Does patient report physical pain? No  Indicate if pain is acute or chronic, and location: N/A  Pain scale rating:       Does patient/parent report functional impairment from medical, developmental, or pain issues?   N\A    Primary Care Behavioral Health Screenings Given? Yes      EDUCATIONAL/LEARNING HISTORY      Does patient report any history of current developmental concerns or Special Education? N/A  How did the patient do in school?  He performed well in school and got straight As but citizenship was a C student  Did the patient graduate high school? Yes  Did the patient attend college? Yes   Did the patient Graduate College? Yes    Degree: Bachelors in finance and accounting  Is patient currently attending a school or program?       EMPLOYMENT/RESOURCES    Is the patient currently employed? 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    Does the patient/parent report adequate financial resources? Yes  Does patient identify impact of presenting issue on work functioning? Yes  Work or income-related stressors:  Has some difficulty with the level of work he has.  Disabled?:      HISTORY:    [x] Patient denies any  history.       SUBSTANCE USE/ADDICTION HISTORY    [] Patient denies use of alcohol.    ALCOHOL  Does patient use alcohol:Yes    If yes how much? \"Normal amount.\"  Several times per week.  Within the past week? No  Last time patient used alcohol: yesterday  Does the patient feel alcohol use is a problem:No    SUBSTANCES    [x] Patient denies use of any illicit substances or street drugs    Does patient use illicit or street drugs?No   Illicit drug use in the past?No   Last illicit drug use:  Has substance use/dependence ever been a problem? No     Any use of prescription medications/pills without a prescription, or for reasons others " than originally prescribed?  No    Marijuana or marijuana products? No  Last time patient used THC products:     What consequences does the patient associate with any of the above substance use and or addictive behaviors? None      Is there a family history of substance use/addiction? No  If so who? N/A    Any other addictive behavior reported (gambling, shopping, sex)? No     SMOKING    [x] Patient denies smoking of any kind.    SPIRITUAL/CULTURAL/IDENTITY:    What are the patient’s/family’s spiritual beliefs or practices?  Pentecostal  What is the patient’s cultural or ethnic background/identity? White  How does the patient identify their sexual orientation?  Straight  How does the patient identify their gender? Male   Does the patient identify any spiritual/cultural/identity factors as relevant to the presenting issue? No    LEGAL HISTORY    [x] Patient denies any legal history.     Has the patient ever been involved with juvenile, adult, or family legal systems? No  Does patient report ever being a victim of a crime?  No  Does patient report involvement in any current legal issues?  No  Does patient report ever being arrested or committing a crime? No  Does patient report any current agency (parole/probation/CPS/) involvement? No    ABUSE/NEGLECT/TRAUMA SCREENING    Does patient report feeling “unsafe” in his/her home, or afraid of anyone? No  Does patient report any history of physical, sexual, or emotional abuse? No  Does the patient report any history of CPS/APS/police involvement related to suspected abuse/neglect or domestic violence? No  Does the patient report any other history of potentially traumatic life events? Yes . His father was paralyzed and he witnessed the accident.  Based on the information provided during the current assessment, is a mandated report of suspected abuse/neglect being made?  No     SAFETY ASSESSMENT - SELF    [x] Patient denies ever having SI, past or present    Does  patient acknowledge current or past symptoms of dangerousness to self? No    Recent change in frequency/specificity/intensity of suicidal thoughts or self-harm behavior? No  Current access to firearms, medications, or other identified means of suicide/self-harm? No  If yes, willing to restrict access to means of suicide/self-harm? N/A  Protective factors present: N/A    Current Suicide Risk: Low  Crisis Safety Plan completed and copy given to patient: No    SAFETY ASSESSMENT - OTHERS    [x] Patient denies ever having HI, past or present     Does patient report past symptoms of aggressive behavior or risk to others? No    Recent change in frequency/specificity/intensity of thoughts or threats to harm others? No  Current access to firearms/other identified means of harm? No  If yes, willing to restrict access to weapons/means of harm? N/A  Protective factors present: N/A    Current Homicide Risk:  Low  Crisis Safety Plan completed and copy given to patient? No  Based on information provided during the current assessment, is a mandated “duty to warn” being exercised? No      HOBBIES/INTERESTS:   Hanging out with his family, hanging out with his wife and kids, good food and wine, traveling, friends, conversations.      STRENGTHS/ASSETS    Strengths Identified by interviewer: Evidence of good judgement, Self-awareness, Family suppport, Social support and Social skills  Strengths Identified by patient:  Loves his work, good at his job, good dad, motivated, strong willed, athletic, board of [employer], independent learner      MENTAL STATUS/OBSERVATIONS:     Participation: Active verbal participation  Grooming: Casual  Orientation:Alert and Fully Oriented   Behavior: Calm  Eye contact: Good   Mood:Euthymic  Affect:Congruent with content  Thought process: Logical and Goal-directed  Thought content:  Within normal limits  Speech: Rate within normal limits and Volume within normal limits  Perception: Within normal limits  Memory:  No gross evidence of memory deficits  Insight: Good  Judgment:  Good  Other:           DIAGNOSTIC IMPRESSION(S):  1. ADHD (attention deficit hyperactivity disorder), combined type            PSYCHOLOGICAL TESTING  Time to Complete testin hour (31395 & [id number]) 21    Adult attention deficit hyperactivity disorder is characterized by a persistent pattern of inattention, hyperactivity, and/or impulsivity that interferes with and impacts work, home life, and relationships. Symptoms are usually seen from childhood, though many adults with ADHD were never diagnosed as children. The symptoms of ADHD can typically be identified using tools that identify the most typical characteristics of ADHD which include attention/concentration and executive functioning. For this assessment the following were used; The Brown ADD Scales, the BDI, BEN, Trail Making test, ASRS-v1, Coding Subtest, Symbol Search Subtest and Symptom Checklist. These assessments are used in conjunction with a thorough diagnostic interview. Results of these assessments are not the sole predictor for a diagnosis. The results may also be used to suggest supplemental strategies to help ensure a successful treatment outcomes.     Interpretation/Integration/Results/Note: 1 hour (22025) 21    The testing results for  Mr. Peacock are as follows:  Mr. Peacock endorsed items indicating a Normal level of depression and a Low level of Anxiety.  Mr. Peacock had mild elevations on all of the Brown scales.  These scales measure the executive functions, which are the self-management functions that support attention in multiple tasks of daily life. The scales indicate that Mr. Peacock may experience mild difficulty with Activation (organizing, prioritizing and activating to work), Focus (sustaining attention and shifting attention to tasks), Effort (regulating alertness, sustaining effort and adjusting processing speed), Emotion (managing frustration and modulating  emotions), Memory (utilizing working memory and accessing recall), and Action (Monitoring and self-regulating action). His highest score was on Focus, but this was in the mild range as well. Coding measures visual-motor dexterity, associative nonverbal learning, and nonverbal short-term memory. Fine-motor dexterity, speed, accuracy and ability to manipulate a pencil contribute to task success; perceptual organization is also important. He scored above average on this test. In addition to processing speed, Symbol Search measures short-term visual memory, procedural and incidental learning ability, psychomotor speed, visual perception, visual-motor coordination, visual scanning ability, cognitive flexibility, attention, concentration, and motivation. He scored above average on this test. On the TRAILS tests his speed was above average. However he made three mistakes which suggests some impulsivity. Difficulty in one or all of these subtests can be an indicator of ADHD. While all of these subtests can be influenced by anxiety, it is not expected to be the case, since Mr. Peacock endorsed only a low level of anxiety.             TREATMENT RECOMMENDATIONS/PATIENT MANAGEMENT SUGGESTIONS    Mr. Peacock is a 31 y.o. year old presenting to [facility] to confirm or rule out a diagnosis of ADHD. Patient was friendly and pleasant upon meeting. He appeared his stated age. He seemed forthcoming with information and seemed to be an accurate . He answered questions and interacted appropriately. His affect was congruent with mood which seemed euthymic. He was oriented to person, place, time, and situation. He  made appropriate eye contact and his rate and tone of speech was average. Thought content and thought processes seemed average. Cognitively, Mr. Peacock seemed to exhibit average intelligence, and judgment and reasoning seemed adequate. He denied suicidal or homicidal ideation. He denied experiencing  "auditory or visual hallucinations or delusions.    Mr. Peacock  has been reportedly struggling with symptoms of ADHD which seem to be more prevalent lately with increased demands from his work. He has a lot of responsibility, and worries that the symptoms he experiences could cause him to make mistakes.  As a result he is very vigilant about his work, pays strict attention to detail, and is \"almost perfectionistic.\"  However, he stated he struggles in other areas related to attention and concentration. Per the interview and testing Mr. Peacock endorsed having enough symptoms of ADHD to make a diagnosis. However, the symptoms do not seem to interfere as much in other domains. In other words, the symptoms didn't translate across domains to a degree normally seen in ADHD. For a diagnosis of ADHD, the symptoms must interfere with or be evident in other domains as well.  It could be that the ADHD symptoms exist, but don't create much of an issue outside of work because he is \"energetic, motivated, athletic, and very social,\" and the symptoms (for example being talkative and energetic) or more of a benefit to him.  Mr. Peacock stated that when he was a boy, despite doing well in school, his citizenship grades were lower due to excessive talking and being disruptive in the classroom with the talking. This demonstrates a potential carry-over of symptoms from childhood, which is also a requirement for the diagnosis.      In sum, after a review of his history, a thorough interview, and with the results of the testing, a diagnosis of ADHD, Combined type Mild can be confirmed. This diagnosis comes mostly from self-report, again because he endorses enough symptoms for the diagnosis.  However, the level of severity is very Mild, and the symptoms could actually change depending on his level of stressors. Mr. Peacock has been instructed to follow up with his referring provider to further discuss the results, and to plan follow up " treatment.      Luclia De La Torre Psy.D  Licensed Psychologist

## 2021-09-17 ENCOUNTER — OFFICE VISIT (OUTPATIENT)
Dept: BEHAVIORAL HEALTH | Facility: CLINIC | Age: 31
End: 2021-09-17
Payer: COMMERCIAL

## 2021-09-17 DIAGNOSIS — F90.9 ATTENTION DEFICIT HYPERACTIVITY DISORDER (ADHD), UNSPECIFIED ADHD TYPE: ICD-10-CM

## 2021-09-17 PROCEDURE — 99213 OFFICE O/P EST LOW 20 MIN: CPT | Mod: GC | Performed by: PSYCHIATRY & NEUROLOGY

## 2021-09-17 RX ORDER — DEXTROAMPHETAMINE SACCHARATE, AMPHETAMINE ASPARTATE MONOHYDRATE, DEXTROAMPHETAMINE SULFATE AND AMPHETAMINE SULFATE 3.75; 3.75; 3.75; 3.75 MG/1; MG/1; MG/1; MG/1
15 CAPSULE, EXTENDED RELEASE ORAL EVERY MORNING
Qty: 30 CAPSULE | Refills: 0 | Status: SHIPPED | OUTPATIENT
Start: 2021-09-17 | End: 2021-12-01 | Stop reason: SDUPTHER

## 2021-09-17 RX ORDER — DEXTROAMPHETAMINE SACCHARATE, AMPHETAMINE ASPARTATE MONOHYDRATE, DEXTROAMPHETAMINE SULFATE AND AMPHETAMINE SULFATE 3.75; 3.75; 3.75; 3.75 MG/1; MG/1; MG/1; MG/1
15 CAPSULE, EXTENDED RELEASE ORAL EVERY MORNING
Qty: 30 CAPSULE | Refills: 0 | Status: SHIPPED | OUTPATIENT
Start: 2021-10-17 | End: 2021-11-16

## 2021-09-17 NOTE — PROGRESS NOTES
"[facility]  PSYCHIATRIC FOLLOW-UP NOTE    Persons in attendance: Patient      Reason for visit / chief complaint:   31 year old male presenting for medication management. Patient previously was started on Adderall XR 15mg.   \"Thing have been good\"       SUBJECTIVE / HPI:  Patient had psychological testing done following previous appointment. He states that he has done well with dose of Adderall XR. He notes that on the days he takes the medication, he feels he is able to relax more. He feels more focused and organized. He has more ability to finish tasks that are difficulty. He states an overall improvement in his anxiety and states he has not had excessive worry. He has had a good mood overall. Patient does not take medication on weekends as he notes his demands are decreased. He denies suicidal ideation, plan or intent at this time.         OBJECTIVE:  There were no vitals taken for this visit.      MSE :   Appearance: well groomed, appropriate    Behavior: calm, cooperative, pleasant, engaged. good eye contact.  No tics. No mannerisms.   Speech : normal rate, normal volume, normal tone   Language: normal vocabulary   Mood: \"good\"   Affect: euthymic   Thought Process: linear, coherent, goal-directed. No flight of ideas.  No loose associations   Thought Content: no suicidal or homicidal ideation and no auditory or visual hallucinations    Attention/Concentration: appropriate    Memory: no gross deficits   Orientation: oriented to person, place, situation   Neurological: Deferred   Fund of Knowledge: appropriate   Insight/Judgment: good / good        No Known Allergies     PAST PSYCHIATRIC HISTORY  Prior psychiatric hospitalization: denies  Prior Self harm/suicide attempt: denies  Prior Diagnosis: denies      PAST PSYCHIATRIC MEDICATIONS  · Adderall XR       FAMILY HISTORY  Psychiatric diagnosis:  Patient states multiple family members take Lexapro for suspected anxiety although not known   History of " suicide attempts:  Denies   Substance abuse history:  Denies      SUBSTANCE USE HISTORY:  ALCOHOL: social   TOBACCO: denies   CANNABIS: denies   OPIOIDS: denies  PRESCRIPTION MEDICATIONS: denies  OTHERS: denies  History of inpatient/outpatient rehab treatment: n/a      SOCIAL HISTORY  Childhood: born in [place] and describes childhood as good. States close relationship with family.  Education: Masters in Accounting and Finance   Employment:   Relationship:  2 years   Kids: two children age 3 and 5 weeks   Current living situation: lives with wife and children   Current/past legal issues: denies   History of emotional/physical/sexual abuse - denies - history of trauma father paralyzed in accident when patient was 11      Review of systems:        Constitutional negative   Eyes negative   Ears/Nose/Mouth/Throat negative   Cardiovascular negative   Respiratory negative   Gastrointestinal negative   Genitourinary negative   Muscular negative   Integumentary negative   Neurological negative   Endocrine negative   Hematologic/Lymphatic negative       Medical Records/Labs/Diagnostic Tests Reviewed: no new        Current Outpatient Medications:   •  amphetamine-dextroamphetamine, 15 mg, Oral, QAM  •  NovoLOG FlexPen, Use as directed, up to 70 units daily  •  BD Pen Needle Micro U/F,   •  FREESTYLE LITE, USE TO TEST BLOOD SUGAR 7 8 TIMES DAILY  •  Toujeo Max SoloStar,  Units, Subcutaneous, QHS           ASSESSMENT:  31 year old male presenting for medication management. Patient has had positive response to Adderall XR 15mg with overall improvement in mood and anxiety as well. He has tolerated medication without side effect at this time.       DDX:  # Attention Deficit Hyperactivity Disorder, Unspecified       PLAN:  - Continue Adderall XR 15mg PO daily  - reviewed, appropriate  -Medication options, alternatives (including no medications) and medication risks/benefits/side effects were discussed in detail.  -The  patient was advised to call, message provider on MyChart, or come in to the clinic if symptoms worsen or if any future questions/issues regarding their medications arise; the patient verbalized understanding and agreement.    -The patient was educated to call 911, call the suicide hotline, or go to local ER if having thoughts of suicide or homicide; verbalized understanding.  -RTC 2 months             Jacqueline Escobar DO

## 2021-09-20 PROCEDURE — 96130 PSYCL TST EVAL PHYS/QHP 1ST: CPT | Performed by: PSYCHOLOGIST

## 2021-12-01 DIAGNOSIS — F90.9 ATTENTION DEFICIT HYPERACTIVITY DISORDER (ADHD), UNSPECIFIED ADHD TYPE: ICD-10-CM

## 2021-12-01 RX ORDER — DEXTROAMPHETAMINE SACCHARATE, AMPHETAMINE ASPARTATE MONOHYDRATE, DEXTROAMPHETAMINE SULFATE AND AMPHETAMINE SULFATE 3.75; 3.75; 3.75; 3.75 MG/1; MG/1; MG/1; MG/1
15 CAPSULE, EXTENDED RELEASE ORAL EVERY MORNING
Qty: 30 CAPSULE | Refills: 0 | Status: SHIPPED | OUTPATIENT
Start: 2022-02-01 | End: 2022-03-02 | Stop reason: SDUPTHER

## 2021-12-01 RX ORDER — DEXTROAMPHETAMINE SACCHARATE, AMPHETAMINE ASPARTATE MONOHYDRATE, DEXTROAMPHETAMINE SULFATE AND AMPHETAMINE SULFATE 3.75; 3.75; 3.75; 3.75 MG/1; MG/1; MG/1; MG/1
15 CAPSULE, EXTENDED RELEASE ORAL EVERY MORNING
Qty: 30 CAPSULE | Refills: 0 | Status: SHIPPED | OUTPATIENT
Start: 2022-01-01 | End: 2022-01-31

## 2021-12-01 RX ORDER — DEXTROAMPHETAMINE SACCHARATE, AMPHETAMINE ASPARTATE MONOHYDRATE, DEXTROAMPHETAMINE SULFATE AND AMPHETAMINE SULFATE 3.75; 3.75; 3.75; 3.75 MG/1; MG/1; MG/1; MG/1
15 CAPSULE, EXTENDED RELEASE ORAL EVERY MORNING
Qty: 30 CAPSULE | Refills: 0 | Status: SHIPPED | OUTPATIENT
Start: 2021-12-01 | End: 2021-12-31

## 2021-12-01 NOTE — TELEPHONE ENCOUNTER
Received request via: Patient    Was the patient seen in the last year in this department? Yes  LOV 08/26/2021  Does the patient have an active prescription (recently filled or refills available) for medication(s) requested? No

## 2022-03-02 ENCOUNTER — OFFICE VISIT (OUTPATIENT)
Dept: MEDICAL GROUP | Facility: LAB | Age: 32
End: 2022-03-02
Payer: COMMERCIAL

## 2022-03-02 VITALS
WEIGHT: 189 LBS | HEART RATE: 56 BPM | OXYGEN SATURATION: 96 % | RESPIRATION RATE: 12 BRPM | TEMPERATURE: 97.3 F | HEIGHT: 71 IN | BODY MASS INDEX: 26.46 KG/M2 | DIASTOLIC BLOOD PRESSURE: 86 MMHG | SYSTOLIC BLOOD PRESSURE: 114 MMHG

## 2022-03-02 DIAGNOSIS — F90.9 ATTENTION DEFICIT HYPERACTIVITY DISORDER (ADHD), UNSPECIFIED ADHD TYPE: ICD-10-CM

## 2022-03-02 DIAGNOSIS — E10.9 TYPE 1 DIABETES MELLITUS WITHOUT COMPLICATION (HCC): ICD-10-CM

## 2022-03-02 PROCEDURE — 99214 OFFICE O/P EST MOD 30 MIN: CPT | Performed by: FAMILY MEDICINE

## 2022-03-02 RX ORDER — DEXTROAMPHETAMINE SACCHARATE, AMPHETAMINE ASPARTATE MONOHYDRATE, DEXTROAMPHETAMINE SULFATE AND AMPHETAMINE SULFATE 3.75; 3.75; 3.75; 3.75 MG/1; MG/1; MG/1; MG/1
15 CAPSULE, EXTENDED RELEASE ORAL EVERY MORNING
Qty: 30 CAPSULE | Refills: 0 | Status: SHIPPED | OUTPATIENT
Start: 2022-03-02 | End: 2022-08-16 | Stop reason: SDUPTHER

## 2022-03-02 RX ORDER — DEXTROAMPHETAMINE SACCHARATE, AMPHETAMINE ASPARTATE MONOHYDRATE, DEXTROAMPHETAMINE SULFATE AND AMPHETAMINE SULFATE 3.75; 3.75; 3.75; 3.75 MG/1; MG/1; MG/1; MG/1
15 CAPSULE, EXTENDED RELEASE ORAL EVERY MORNING
Qty: 30 CAPSULE | Refills: 0 | Status: SHIPPED | OUTPATIENT
Start: 2022-04-02 | End: 2022-08-16 | Stop reason: SDUPTHER

## 2022-03-02 RX ORDER — DEXTROAMPHETAMINE SACCHARATE, AMPHETAMINE ASPARTATE MONOHYDRATE, DEXTROAMPHETAMINE SULFATE AND AMPHETAMINE SULFATE 3.75; 3.75; 3.75; 3.75 MG/1; MG/1; MG/1; MG/1
15 CAPSULE, EXTENDED RELEASE ORAL EVERY MORNING
Qty: 30 CAPSULE | Refills: 0 | Status: SHIPPED | OUTPATIENT
Start: 2022-05-02 | End: 2022-08-16 | Stop reason: SDUPTHER

## 2022-03-02 ASSESSMENT — PATIENT HEALTH QUESTIONNAIRE - PHQ9: CLINICAL INTERPRETATION OF PHQ2 SCORE: 0

## 2022-03-02 NOTE — PROGRESS NOTES
"Subjective:     CC: Follow up, med refill    HPI:   Marquise presents today with:    ADHD  Diagnosed by psychiatry.  He has been doing well on Adderall XR 15 mg daily.  No issues with sleep or appetite.  He does wonder if stress/anxiety could also be playing a role in symptoms and has had some family members do well on Lexapro, he does not want to start this today but may want to consider in the future.    Type 1 DM  He had planned to reestablish with endocrinology but never ended up getting anything scheduled.  This was diagnosed at 11 years old and he has a good understanding of management.  A1c was 6.2% last year.  Has CGM, uses Toujeo 70 to 100 units nightly as well as Humalog or NovoLog 5-7 times daily up to 10 units depending on activity level, corrects with 1 unit per 7-8 carbs.  Was getting eye exams regularly but needs new eye doctor.    Medications, past medical history, allergies, and social history have been reviewed and updated.      Objective:       Exam:  /86 (BP Location: Right arm, Patient Position: Sitting, BP Cuff Size: Adult)   Pulse (!) 56   Temp 36.3 °C (97.3 °F)   Resp 12   Ht 1.803 m (5' 11\")   Wt 85.7 kg (189 lb)   SpO2 96%   BMI 26.36 kg/m²  Body mass index is 26.36 kg/m².    Constitutional: Alert. Well appearing. No distress.  Skin: Warm, dry, good turgor, no visible rashes.  Eye: Equal, round and reactive to light, conjunctiva clear, lids normal.  Respiratory: Normal effort.   Neuro: Moves all four extremities. No facial droop.  Psych: Answers questions appropriately. Normal affect and mood.      Assessment & Plan:     31 y.o. male with the following -     1. Attention deficit hyperactivity disorder (ADHD), unspecified ADHD type  Diagnosed by psychiatry last year.  Doing well on Adderall without adverse effects.  He does note that he thinks stress may be playing a role in symptoms as well on some family members have done well on Lexapro.  He would like to consider adding this " in the future but not at this point.  Continue Adderall for now.  Follow-up 3 to 6 months.  - amphetamine-dextroamphetamine (ADDERALL XR) 15 MG XR capsule; Take 1 Capsule by mouth every morning for 30 days.  Dispense: 30 Capsule; Refill: 0  - amphetamine-dextroamphetamine (ADDERALL XR) 15 MG XR capsule; Take 1 Capsule by mouth every morning for 30 days.  Dispense: 30 Capsule; Refill: 0  - amphetamine-dextroamphetamine (ADDERALL XR) 15 MG XR capsule; Take 1 Capsule by mouth every morning for 30 days.  Dispense: 30 Capsule; Refill: 0    2. Type 1 diabetes mellitus without complication (HCC)  Has historically been well controlled but no recent follow-up with endocrinology. Labs as below.  He has good understanding of management and adjusts his Toujeo and short acting.  Referral placed to reestablish with endocrinology.  - CBC WITH DIFFERENTIAL; Future  - Comp Metabolic Panel; Future  - Lipid Profile; Future  - HEMOGLOBIN A1C; Future  - MICROALBUMIN CREAT RATIO URINE; Future  - Referral to Ophthalmology  - Referral to Endocrinology      Please note that this note was created using voice recognition software.

## 2022-03-15 DIAGNOSIS — E10.9 TYPE 1 DIABETES MELLITUS WITHOUT COMPLICATION (HCC): ICD-10-CM

## 2022-06-30 ENCOUNTER — TELEPHONE (OUTPATIENT)
Dept: MEDICAL GROUP | Facility: LAB | Age: 32
End: 2022-06-30
Payer: COMMERCIAL

## 2022-06-30 RX ORDER — BLOOD-GLUCOSE METER
KIT MISCELLANEOUS
Qty: 200 STRIP | Refills: 3 | Status: SHIPPED | OUTPATIENT
Start: 2022-06-30 | End: 2022-09-11 | Stop reason: SDUPTHER

## 2022-06-30 NOTE — TELEPHONE ENCOUNTER
[facility] called and said they only received the cover page of the last fax sent, I resent the fax to them.

## 2022-06-30 NOTE — TELEPHONE ENCOUNTER
Received request via: Patient    Was the patient seen in the last year in this department? Yes  LOV 03/02/2022  Does the patient have an active prescription (recently filled or refills available) for medication(s) requested? No

## 2022-07-11 ENCOUNTER — TELEPHONE (OUTPATIENT)
Dept: MEDICAL GROUP | Facility: LAB | Age: 32
End: 2022-07-11
Payer: COMMERCIAL

## 2022-07-11 NOTE — TELEPHONE ENCOUNTER
FINAL PRIOR AUTHORIZATION STATUS:    1.  Name of Medication & Dose: insulin aspart (NOVOLOG FLEXPEN) 100 UNIT/ML injection PEN     2. Prior Auth Status: Approved through N/A, medication available without authorization.     3. Action Taken: Pharmacy Notified: yes Patient Notified: yes    Prior auth submitted to plan through CoverMyMeds. Medication is available without authorization per CoverMyMeds web site.   Key: [id number]

## 2022-08-16 DIAGNOSIS — F90.9 ATTENTION DEFICIT HYPERACTIVITY DISORDER (ADHD), UNSPECIFIED ADHD TYPE: ICD-10-CM

## 2022-08-16 RX ORDER — DEXTROAMPHETAMINE SACCHARATE, AMPHETAMINE ASPARTATE MONOHYDRATE, DEXTROAMPHETAMINE SULFATE AND AMPHETAMINE SULFATE 3.75; 3.75; 3.75; 3.75 MG/1; MG/1; MG/1; MG/1
15 CAPSULE, EXTENDED RELEASE ORAL EVERY MORNING
Qty: 30 CAPSULE | Refills: 0 | Status: SHIPPED | OUTPATIENT
Start: 2022-09-15 | End: 2022-10-15

## 2022-08-16 RX ORDER — DEXTROAMPHETAMINE SACCHARATE, AMPHETAMINE ASPARTATE MONOHYDRATE, DEXTROAMPHETAMINE SULFATE AND AMPHETAMINE SULFATE 3.75; 3.75; 3.75; 3.75 MG/1; MG/1; MG/1; MG/1
15 CAPSULE, EXTENDED RELEASE ORAL EVERY MORNING
Qty: 30 CAPSULE | Refills: 0 | Status: SHIPPED | OUTPATIENT
Start: 2022-10-15 | End: 2022-11-14

## 2022-08-16 RX ORDER — DEXTROAMPHETAMINE SACCHARATE, AMPHETAMINE ASPARTATE MONOHYDRATE, DEXTROAMPHETAMINE SULFATE AND AMPHETAMINE SULFATE 3.75; 3.75; 3.75; 3.75 MG/1; MG/1; MG/1; MG/1
15 CAPSULE, EXTENDED RELEASE ORAL EVERY MORNING
Qty: 30 CAPSULE | Refills: 0 | Status: SHIPPED | OUTPATIENT
Start: 2022-08-16 | End: 2022-09-11 | Stop reason: SDUPTHER

## 2022-08-16 NOTE — TELEPHONE ENCOUNTER
Received request via: Pharmacy    Was the patient seen in the last year in this department? Yes  3/2/2022  Does the patient have an active prescription (recently filled or refills available) for medication(s) requested? No

## 2022-09-01 ENCOUNTER — TELEPHONE (OUTPATIENT)
Dept: MEDICAL GROUP | Facility: LAB | Age: 32
End: 2022-09-01
Payer: COMMERCIAL

## 2022-09-01 NOTE — TELEPHONE ENCOUNTER
JENNYFER FOSTER (Key: [id number])  NovoLOG FlexPen 100UNIT/ML pen-injectors     Form  [facility] ePA Form

## 2022-09-10 ENCOUNTER — TELEPHONE (OUTPATIENT)
Dept: MEDICAL GROUP | Facility: MEDICAL CENTER | Age: 32
End: 2022-09-10
Payer: COMMERCIAL

## 2022-09-10 DIAGNOSIS — E10.9 TYPE 1 DIABETES MELLITUS WITHOUT COMPLICATION (HCC): ICD-10-CM

## 2022-09-10 RX ORDER — INSULIN ASPART 100 [IU]/ML
INJECTION, SOLUTION INTRAVENOUS; SUBCUTANEOUS
Qty: 30 ML | Refills: 0 | Status: SHIPPED
Start: 2022-09-10 | End: 2022-10-12

## 2022-09-10 NOTE — TELEPHONE ENCOUNTER
On call note. Received a call the patient needs Novolog and Toujeo refilled to [facility] on [address].     Upon reviewing the chart it appears there is already a refill of Toujeo from 8/16/22 at the pharmacy. I will send a refill of the Novolog flexpen using the same sig as listed.

## 2022-09-12 ENCOUNTER — TELEPHONE (OUTPATIENT)
Dept: MEDICAL GROUP | Facility: LAB | Age: 32
End: 2022-09-12
Payer: COMMERCIAL

## 2022-09-12 DIAGNOSIS — E10.9 TYPE 1 DIABETES MELLITUS WITHOUT COMPLICATION (HCC): ICD-10-CM

## 2022-09-12 RX ORDER — INSULIN ASPART 100 [IU]/ML
INJECTION, SOLUTION INTRAVENOUS; SUBCUTANEOUS
Qty: 30 ML | Refills: 0 | OUTPATIENT
Start: 2022-09-12

## 2022-09-12 NOTE — TELEPHONE ENCOUNTER
Key: [id number]    Drug  Toujeo Max SoloStar 300UNIT/ML pen-injectors  Form  [facility] ePA Form  Original Claim Info  9G Quantity Dispensed Exceeds Maximum Allowed, units(0);;Limited to 9/30ds.

## 2022-09-12 NOTE — TELEPHONE ENCOUNTER
(Key: [id number])  Rx #: [id number]  NovoLOG FlexPen 100UNIT/ML pen-injectors     Form  [facility]

## 2022-09-13 RX ORDER — INSULIN ASPART 100 [IU]/ML
INJECTION, SOLUTION INTRAVENOUS; SUBCUTANEOUS
Qty: 3 ML | Refills: 5 | Status: SHIPPED | OUTPATIENT
Start: 2022-09-13 | End: 2023-10-14 | Stop reason: SDUPTHER

## 2022-09-13 RX ORDER — INSULIN GLARGINE 300 U/ML
INJECTION, SOLUTION SUBCUTANEOUS
Qty: 3 ML | Refills: 5 | Status: SHIPPED | OUTPATIENT
Start: 2022-09-13 | End: 2022-10-12

## 2022-10-12 ENCOUNTER — OFFICE VISIT (OUTPATIENT)
Dept: MEDICAL GROUP | Facility: LAB | Age: 32
End: 2022-10-12
Payer: COMMERCIAL

## 2022-10-12 VITALS
HEIGHT: 71 IN | TEMPERATURE: 97.5 F | SYSTOLIC BLOOD PRESSURE: 120 MMHG | WEIGHT: 193.8 LBS | RESPIRATION RATE: 12 BRPM | HEART RATE: 72 BPM | DIASTOLIC BLOOD PRESSURE: 72 MMHG | BODY MASS INDEX: 27.13 KG/M2 | OXYGEN SATURATION: 98 %

## 2022-10-12 DIAGNOSIS — E10.9 TYPE 1 DIABETES MELLITUS WITHOUT COMPLICATION (HCC): ICD-10-CM

## 2022-10-12 DIAGNOSIS — Z00.00 WELL ADULT EXAM: ICD-10-CM

## 2022-10-12 LAB
HBA1C MFR BLD: 5.9 % (ref 0–5.6)
INT CON NEG: ABNORMAL
INT CON POS: ABNORMAL

## 2022-10-12 PROCEDURE — 99395 PREV VISIT EST AGE 18-39: CPT | Performed by: FAMILY MEDICINE

## 2022-10-12 PROCEDURE — 83036 HEMOGLOBIN GLYCOSYLATED A1C: CPT | Performed by: FAMILY MEDICINE

## 2022-10-12 RX ORDER — INSULIN LISPRO 100 [IU]/ML
INJECTION, SOLUTION INTRAVENOUS; SUBCUTANEOUS
COMMUNITY
Start: 2022-09-10 | End: 2022-10-12 | Stop reason: SDUPTHER

## 2022-10-12 RX ORDER — INSULIN LISPRO 100 [IU]/ML
INJECTION, SOLUTION INTRAVENOUS; SUBCUTANEOUS
Qty: 30 ML | Refills: 3 | Status: SHIPPED | OUTPATIENT
Start: 2022-10-12 | End: 2022-11-04

## 2022-10-12 RX ORDER — INSULIN ASPART 100 [IU]/ML
INJECTION, SOLUTION INTRAVENOUS; SUBCUTANEOUS
Qty: 30 ML | Refills: 3 | Status: SHIPPED | OUTPATIENT
Start: 2022-10-12 | End: 2023-10-20 | Stop reason: SDUPTHER

## 2022-10-12 NOTE — PROGRESS NOTES
"Subjective:     CC: Annual    HPI:   Marquise presents today for annual exam.  No specific concerns.  Due for pneumonia, flu, COVID vaccines, declines today.  Tdap up-to-date.  Exercises regularly, does well with diabetes diet.  Alcohol in moderation, non-smoker.    Has Dexcom.  Uses Toujeo  units nightly.  Previously using NovoLog but insurance would not cover and switch to Humalog.  He has much more variable blood sugar with Humalog as compared to NovoLog.  This is requiring much more frequent checks and constant insulin titration.  No lows below 70 but is getting highs much more often.    Medications, past medical history, allergies, and social history have been reviewed and updated.      Objective:       Exam:  /72 (BP Location: Right arm, Patient Position: Sitting, BP Cuff Size: Adult)   Pulse 72   Temp 36.4 °C (97.5 °F)   Resp 12   Ht 1.803 m (5' 11\")   Wt 87.9 kg (193 lb 12.8 oz)   SpO2 98%   BMI 27.03 kg/m²  Body mass index is 27.03 kg/m².    Constitutional: Alert. Well appearing. No distress.  Skin: Warm, dry, good turgor, no visible rashes.  Eye: Equal, round and reactive to light, conjunctiva clear, lids normal.  ENMT: Moist mucous membranes. Normal dentition.  Respiratory: Normal effort. Lungs are clear to auscultation bilaterally.  Cardiovascular: Regular rate and rhythm. Normal S1/S2. No murmurs, rubs or gallops.   Neuro: Moves all four extremities. No facial droop.  Psych: Answers questions appropriately. Normal affect and mood.      Assessment & Plan:     32 y.o. male with the following -     1. Well adult exam  Health maintenance reviewed and updated.  Age-appropriate anticipatory guidance provided.  Labs ordered at previous visit.  Declines vaccines.    2. Type 1 diabetes mellitus without complication (HCC)  Well controlled, A1c today 5.9%.  Continue Toujeo 70 to 100 units nightly.  Has much more variable blood sugars with Humalog and does much better with NovoLog.  It is " medically necessary for him to use NovoLog.  Rx is sent for both for the short-term but will plan to prior auth NovoLog for him.  - POCT Hemoglobin A1C  - insulin aspart (NOVOLOG FLEXPEN) 100 UNIT/ML injection PEN; Use as directed, up to 70 units daily  Dispense: 30 mL; Refill: 3  - insulin lispro (HUMALOG KWIKPEN) 100 UNIT/ML Solution Pen-injector injection PEN; Use as directed to treat blood sugar.  Up to 70 units daily.  Dispense: 30 mL; Refill: 3  - Referral to Ophthalmology      Please note that this note was created using voice recognition software.

## 2022-10-13 ENCOUNTER — TELEPHONE (OUTPATIENT)
Dept: MEDICAL GROUP | Facility: LAB | Age: 32
End: 2022-10-13
Payer: COMMERCIAL

## 2022-10-13 NOTE — TELEPHONE ENCOUNTER
(Key: [id number])  Rx #: [id number]  NovoLOG FlexPen 100UNIT/ML pen-injectors     Form  [facility] Form    Faxed last visit notes to [phone]

## 2022-10-26 DIAGNOSIS — Z30.09 VASECTOMY EVALUATION: ICD-10-CM

## 2023-02-01 ENCOUNTER — PATIENT MESSAGE (OUTPATIENT)
Dept: MEDICAL GROUP | Facility: LAB | Age: 33
End: 2023-02-01
Payer: COMMERCIAL

## 2023-02-01 DIAGNOSIS — F90.9 ATTENTION DEFICIT HYPERACTIVITY DISORDER (ADHD), UNSPECIFIED ADHD TYPE: ICD-10-CM

## 2023-02-07 DIAGNOSIS — F90.9 ATTENTION DEFICIT HYPERACTIVITY DISORDER (ADHD), UNSPECIFIED ADHD TYPE: ICD-10-CM

## 2023-02-07 RX ORDER — DEXTROAMPHETAMINE SACCHARATE, AMPHETAMINE ASPARTATE MONOHYDRATE, DEXTROAMPHETAMINE SULFATE AND AMPHETAMINE SULFATE 3.75; 3.75; 3.75; 3.75 MG/1; MG/1; MG/1; MG/1
15 CAPSULE, EXTENDED RELEASE ORAL EVERY MORNING
Qty: 30 CAPSULE | Refills: 0 | Status: SHIPPED | OUTPATIENT
Start: 2023-02-07 | End: 2023-03-02 | Stop reason: SDUPTHER

## 2023-02-08 ENCOUNTER — TELEPHONE (OUTPATIENT)
Dept: MEDICAL GROUP | Facility: LAB | Age: 33
End: 2023-02-08
Payer: COMMERCIAL

## 2023-02-08 NOTE — TELEPHONE ENCOUNTER
Patient was notified on 02/07/2023 through [employer] that he'll need to call around to different pharmacies if [facility] [address] does not have it.

## 2023-03-02 RX ORDER — DEXTROAMPHETAMINE SACCHARATE, AMPHETAMINE ASPARTATE MONOHYDRATE, DEXTROAMPHETAMINE SULFATE AND AMPHETAMINE SULFATE 3.75; 3.75; 3.75; 3.75 MG/1; MG/1; MG/1; MG/1
15 CAPSULE, EXTENDED RELEASE ORAL EVERY MORNING
Qty: 30 CAPSULE | Refills: 0 | Status: SHIPPED | OUTPATIENT
Start: 2023-03-02 | End: 2023-03-30 | Stop reason: SDUPTHER

## 2023-03-06 ENCOUNTER — TELEPHONE (OUTPATIENT)
Dept: MEDICAL GROUP | Facility: LAB | Age: 33
End: 2023-03-06
Payer: COMMERCIAL

## 2023-03-06 NOTE — TELEPHONE ENCOUNTER
[facility]: amphetamine-dextroamphetamine (ADDERALL XR)  This pharmacy only has the brand name and pt's insurance will not cover but generic. alternatives pharmacy does not have in stock. Pt informed to call around.

## 2023-03-07 ENCOUNTER — TELEPHONE (OUTPATIENT)
Dept: MEDICAL GROUP | Facility: LAB | Age: 33
End: 2023-03-07
Payer: COMMERCIAL

## 2023-03-07 NOTE — TELEPHONE ENCOUNTER
MEDICATION PRIOR AUTHORIZATION NEEDED:    1. Name of Medication: amphetamine-dextroamphetamine (ADDERALL XR) 15 MG XR capsule    2. Requested By (Name of Pharmacy):   [facility]  P: [phone]  F: [phone]     3. Is insurance on file current? Yes    4. What is the name & phone number of the 3rd party payor?   [facility]/[place]  P: [phone]  F: [phone]    Prior auth submitted to plan through CoverMyMeds, waiting for possible approval.   Key: [id number]

## 2023-03-29 NOTE — TELEPHONE ENCOUNTER
DOCUMENTATION OF PAR STATUS:    1. Name of Medication & Dose:  amphetamine-dextroamphetamine (ADDERALL XR) 15 MG XR capsule    2. Name of Prescription Coverage Company & phone #:   [employer]  P: [phone]  F: [phone]    3. Date Prior Auth Submitted: 03/07/2023    4. What information was given to obtain insurance decision? N/A    5. Prior Auth Status? Cancelled by plan    6. Patient Notified: yes

## 2023-03-30 DIAGNOSIS — F90.9 ATTENTION DEFICIT HYPERACTIVITY DISORDER (ADHD), UNSPECIFIED ADHD TYPE: ICD-10-CM

## 2023-03-30 RX ORDER — DEXTROAMPHETAMINE SACCHARATE, AMPHETAMINE ASPARTATE MONOHYDRATE, DEXTROAMPHETAMINE SULFATE AND AMPHETAMINE SULFATE 3.75; 3.75; 3.75; 3.75 MG/1; MG/1; MG/1; MG/1
15 CAPSULE, EXTENDED RELEASE ORAL EVERY MORNING
Qty: 30 CAPSULE | Refills: 0 | Status: SHIPPED | OUTPATIENT
Start: 2023-03-30 | End: 2023-03-31 | Stop reason: SDUPTHER

## 2023-03-30 NOTE — TELEPHONE ENCOUNTER
Received request via: Patient    Was the patient seen in the last year in this department? Yes  10/12/22  Does the patient have an active prescription (recently filled or refills available) for medication(s) requested? No    Does the patient have [facility] and need 100 day supply (blood pressure, diabetes and cholesterol meds only)? Medication is not for cholesterol, blood pressure or diabetes

## 2023-03-31 ENCOUNTER — PATIENT MESSAGE (OUTPATIENT)
Dept: MEDICAL GROUP | Facility: LAB | Age: 33
End: 2023-03-31
Payer: COMMERCIAL

## 2023-03-31 DIAGNOSIS — F90.9 ATTENTION DEFICIT HYPERACTIVITY DISORDER (ADHD), UNSPECIFIED ADHD TYPE: ICD-10-CM

## 2023-03-31 RX ORDER — DEXTROAMPHETAMINE SACCHARATE, AMPHETAMINE ASPARTATE MONOHYDRATE, DEXTROAMPHETAMINE SULFATE AND AMPHETAMINE SULFATE 3.75; 3.75; 3.75; 3.75 MG/1; MG/1; MG/1; MG/1
15 CAPSULE, EXTENDED RELEASE ORAL EVERY MORNING
Qty: 30 CAPSULE | Refills: 0 | Status: SHIPPED | OUTPATIENT
Start: 2023-03-31 | End: 2023-10-12 | Stop reason: SDUPTHER

## 2023-04-04 ENCOUNTER — PATIENT MESSAGE (OUTPATIENT)
Dept: MEDICAL GROUP | Facility: LAB | Age: 33
End: 2023-04-04
Payer: COMMERCIAL

## 2023-04-04 DIAGNOSIS — E10.9 TYPE 1 DIABETES MELLITUS WITHOUT COMPLICATION (HCC): ICD-10-CM

## 2023-04-04 RX ORDER — DEXTROAMPHETAMINE SACCHARATE, AMPHETAMINE ASPARTATE, DEXTROAMPHETAMINE SULFATE AND AMPHETAMINE SULFATE 7.5; 7.5; 7.5; 7.5 MG/1; MG/1; MG/1; MG/1
30 TABLET ORAL 2 TIMES DAILY
Qty: 60 TABLET | Refills: 0 | Status: SHIPPED | OUTPATIENT
Start: 2023-04-04 | End: 2023-05-04

## 2023-05-25 ENCOUNTER — PATIENT MESSAGE (OUTPATIENT)
Dept: MEDICAL GROUP | Facility: LAB | Age: 33
End: 2023-05-25
Payer: COMMERCIAL

## 2023-05-25 DIAGNOSIS — E10.9 TYPE 1 DIABETES MELLITUS WITHOUT COMPLICATION (HCC): ICD-10-CM

## 2023-05-26 DIAGNOSIS — E10.9 TYPE 1 DIABETES MELLITUS WITHOUT COMPLICATION (HCC): ICD-10-CM

## 2023-05-26 RX ORDER — INSULIN LISPRO 100 [IU]/ML
INJECTION, SOLUTION INTRAVENOUS; SUBCUTANEOUS
Qty: 3 ML | Refills: 0 | Status: SHIPPED | OUTPATIENT
Start: 2023-05-26 | End: 2023-10-20 | Stop reason: SDUPTHER

## 2023-05-26 RX ORDER — INSULIN GLARGINE 300 U/ML
70-100 INJECTION, SOLUTION SUBCUTANEOUS NIGHTLY
Qty: 3 ML | Refills: 0 | Status: SHIPPED | OUTPATIENT
Start: 2023-05-26 | End: 2023-10-20 | Stop reason: SDUPTHER

## 2023-05-26 RX ORDER — INSULIN GLARGINE 300 U/ML
INJECTION, SOLUTION SUBCUTANEOUS
Qty: 3 ML | Refills: 0 | Status: SHIPPED | OUTPATIENT
Start: 2023-05-26 | End: 2023-09-22

## 2023-09-20 DIAGNOSIS — E10.9 TYPE 1 DIABETES MELLITUS WITHOUT COMPLICATION (HCC): ICD-10-CM

## 2023-09-22 RX ORDER — INSULIN GLARGINE 300 U/ML
INJECTION, SOLUTION SUBCUTANEOUS
Qty: 3 ML | Refills: 5 | Status: SHIPPED | OUTPATIENT
Start: 2023-09-22 | End: 2023-12-12

## 2023-10-12 ENCOUNTER — PATIENT MESSAGE (OUTPATIENT)
Dept: MEDICAL GROUP | Facility: LAB | Age: 33
End: 2023-10-12
Payer: COMMERCIAL

## 2023-10-12 DIAGNOSIS — F90.9 ATTENTION DEFICIT HYPERACTIVITY DISORDER (ADHD), UNSPECIFIED ADHD TYPE: ICD-10-CM

## 2023-10-12 RX ORDER — DEXTROAMPHETAMINE SACCHARATE, AMPHETAMINE ASPARTATE MONOHYDRATE, DEXTROAMPHETAMINE SULFATE AND AMPHETAMINE SULFATE 3.75; 3.75; 3.75; 3.75 MG/1; MG/1; MG/1; MG/1
15 CAPSULE, EXTENDED RELEASE ORAL EVERY MORNING
Qty: 30 CAPSULE | Refills: 0 | Status: SHIPPED | OUTPATIENT
Start: 2023-10-12 | End: 2023-10-17 | Stop reason: SDUPTHER

## 2023-10-14 DIAGNOSIS — E10.9 TYPE 1 DIABETES MELLITUS WITHOUT COMPLICATION (HCC): ICD-10-CM

## 2023-10-14 RX ORDER — INSULIN ASPART 100 [IU]/ML
INJECTION, SOLUTION INTRAVENOUS; SUBCUTANEOUS
Qty: 3 ML | Refills: 5 | Status: SHIPPED | OUTPATIENT
Start: 2023-10-14 | End: 2023-10-20 | Stop reason: SDUPTHER

## 2023-10-17 RX ORDER — DEXTROAMPHETAMINE SACCHARATE, AMPHETAMINE ASPARTATE MONOHYDRATE, DEXTROAMPHETAMINE SULFATE AND AMPHETAMINE SULFATE 3.75; 3.75; 3.75; 3.75 MG/1; MG/1; MG/1; MG/1
15 CAPSULE, EXTENDED RELEASE ORAL EVERY MORNING
Qty: 30 CAPSULE | Refills: 0 | Status: SHIPPED | OUTPATIENT
Start: 2023-10-17 | End: 2023-10-20 | Stop reason: SDUPTHER

## 2023-10-19 SDOH — ECONOMIC STABILITY: TRANSPORTATION INSECURITY

## 2023-10-19 SDOH — HEALTH STABILITY: MENTAL HEALTH

## 2023-10-19 SDOH — ECONOMIC STABILITY: HOUSING INSECURITY

## 2023-10-19 SDOH — HEALTH STABILITY: PHYSICAL HEALTH: ON AVERAGE, HOW MANY DAYS PER WEEK DO YOU ENGAGE IN MODERATE TO STRENUOUS EXERCISE (LIKE A BRISK WALK)?: 3 DAYS

## 2023-10-19 SDOH — HEALTH STABILITY: PHYSICAL HEALTH: ON AVERAGE, HOW MANY MINUTES DO YOU ENGAGE IN EXERCISE AT THIS LEVEL?: 50 MIN

## 2023-10-20 ENCOUNTER — OFFICE VISIT (OUTPATIENT)
Dept: MEDICAL GROUP | Facility: LAB | Age: 33
End: 2023-10-20
Payer: COMMERCIAL

## 2023-10-20 VITALS
BODY MASS INDEX: 27.69 KG/M2 | TEMPERATURE: 98.4 F | SYSTOLIC BLOOD PRESSURE: 106 MMHG | HEART RATE: 78 BPM | OXYGEN SATURATION: 98 % | RESPIRATION RATE: 12 BRPM | WEIGHT: 197.8 LBS | HEIGHT: 71 IN | DIASTOLIC BLOOD PRESSURE: 70 MMHG

## 2023-10-20 DIAGNOSIS — Z11.59 SCREENING FOR VIRAL DISEASE: ICD-10-CM

## 2023-10-20 DIAGNOSIS — Z00.00 WELL ADULT EXAM: ICD-10-CM

## 2023-10-20 DIAGNOSIS — E10.9 TYPE 1 DIABETES MELLITUS WITHOUT COMPLICATION (HCC): ICD-10-CM

## 2023-10-20 DIAGNOSIS — E78.00 ELEVATED LDL CHOLESTEROL LEVEL: ICD-10-CM

## 2023-10-20 DIAGNOSIS — F90.9 ATTENTION DEFICIT HYPERACTIVITY DISORDER (ADHD), UNSPECIFIED ADHD TYPE: ICD-10-CM

## 2023-10-20 LAB
HBA1C MFR BLD: 5.9 % (ref ?–5.8)
POCT INT CON NEG: NEGATIVE
POCT INT CON POS: POSITIVE

## 2023-10-20 PROCEDURE — 83036 HEMOGLOBIN GLYCOSYLATED A1C: CPT | Performed by: FAMILY MEDICINE

## 2023-10-20 PROCEDURE — 3074F SYST BP LT 130 MM HG: CPT | Performed by: FAMILY MEDICINE

## 2023-10-20 PROCEDURE — 99395 PREV VISIT EST AGE 18-39: CPT | Performed by: FAMILY MEDICINE

## 2023-10-20 PROCEDURE — 3078F DIAST BP <80 MM HG: CPT | Performed by: FAMILY MEDICINE

## 2023-10-20 RX ORDER — INSULIN ASPART 100 [IU]/ML
INJECTION, SOLUTION INTRAVENOUS; SUBCUTANEOUS
Qty: 30 ML | Refills: 3 | Status: SHIPPED
Start: 2023-10-20 | End: 2023-10-20

## 2023-10-20 RX ORDER — DEXTROAMPHETAMINE SACCHARATE, AMPHETAMINE ASPARTATE MONOHYDRATE, DEXTROAMPHETAMINE SULFATE AND AMPHETAMINE SULFATE 3.75; 3.75; 3.75; 3.75 MG/1; MG/1; MG/1; MG/1
15 CAPSULE, EXTENDED RELEASE ORAL EVERY MORNING
Qty: 30 CAPSULE | Refills: 0 | Status: SHIPPED | OUTPATIENT
Start: 2023-12-08 | End: 2024-01-07

## 2023-10-20 RX ORDER — INSULIN ASPART 100 [IU]/ML
INJECTION, SOLUTION INTRAVENOUS; SUBCUTANEOUS
Qty: 3 ML | Refills: 3 | Status: SHIPPED
Start: 2023-10-20 | End: 2023-12-19

## 2023-10-20 RX ORDER — DEXTROAMPHETAMINE SACCHARATE, AMPHETAMINE ASPARTATE MONOHYDRATE, DEXTROAMPHETAMINE SULFATE AND AMPHETAMINE SULFATE 3.75; 3.75; 3.75; 3.75 MG/1; MG/1; MG/1; MG/1
CAPSULE, EXTENDED RELEASE ORAL
COMMUNITY
End: 2023-10-20

## 2023-10-20 RX ORDER — DEXTROAMPHETAMINE SACCHARATE, AMPHETAMINE ASPARTATE MONOHYDRATE, DEXTROAMPHETAMINE SULFATE AND AMPHETAMINE SULFATE 3.75; 3.75; 3.75; 3.75 MG/1; MG/1; MG/1; MG/1
15 CAPSULE, EXTENDED RELEASE ORAL EVERY MORNING
Qty: 30 CAPSULE | Refills: 0 | Status: SHIPPED | OUTPATIENT
Start: 2023-11-13 | End: 2023-12-13

## 2023-10-20 RX ORDER — DEXTROAMPHETAMINE SACCHARATE, AMPHETAMINE ASPARTATE MONOHYDRATE, DEXTROAMPHETAMINE SULFATE AND AMPHETAMINE SULFATE 3.75; 3.75; 3.75; 3.75 MG/1; MG/1; MG/1; MG/1
15 CAPSULE, EXTENDED RELEASE ORAL EVERY MORNING
Qty: 30 CAPSULE | Refills: 0 | Status: SHIPPED
Start: 2024-01-08 | End: 2023-12-19

## 2023-10-20 RX ORDER — INSULIN ASPART 100 [IU]/ML
INJECTION, SOLUTION INTRAVENOUS; SUBCUTANEOUS
Qty: 3 ML | Refills: 0 | Status: SHIPPED | OUTPATIENT
Start: 2023-10-20

## 2023-10-20 RX ORDER — DEXTROAMPHETAMINE SACCHARATE, AMPHETAMINE ASPARTATE MONOHYDRATE, DEXTROAMPHETAMINE SULFATE AND AMPHETAMINE SULFATE 3.75; 3.75; 3.75; 3.75 MG/1; MG/1; MG/1; MG/1
15 CAPSULE, EXTENDED RELEASE ORAL EVERY MORNING
Qty: 30 CAPSULE | Refills: 0 | Status: CANCELLED | OUTPATIENT
Start: 2023-12-08 | End: 2024-01-07

## 2023-10-20 RX ORDER — INSULIN LISPRO 100 [IU]/ML
INJECTION, SOLUTION INTRAVENOUS; SUBCUTANEOUS
Qty: 3 ML | Refills: 0 | Status: SHIPPED
Start: 2023-10-20 | End: 2023-12-12

## 2023-10-20 RX ORDER — INSULIN GLARGINE 300 U/ML
70-100 INJECTION, SOLUTION SUBCUTANEOUS NIGHTLY
Qty: 3 ML | Refills: 0 | Status: SHIPPED
Start: 2023-10-20 | End: 2024-03-06

## 2023-10-20 ASSESSMENT — PATIENT HEALTH QUESTIONNAIRE - PHQ9: CLINICAL INTERPRETATION OF PHQ2 SCORE: 0

## 2023-10-20 NOTE — PROGRESS NOTES
Subjective:     CC:   Chief Complaint   Patient presents with    Annual Exam       HPI:   Marquise Peacock is a 33 y.o. male with a history of type 1 diabetes who presents for an annual exam. He is feeling well and has no complaints.    Last Tdap: 2018   Qualify for abdominal us screen: no  Qualify for hep c screen: ordered  Regular exercise: yes  Diet: DM diet, some intermittent fasting    A1c 5.9  No lows  Toujeo  daily  Prefers novolog but better insurance coverage with humalog   Short actin-10U in AM, 10-20U PM.  More on the weekends when he eats more often.    He  has a past medical history of Diabetes (HCC).  He  has no past surgical history on file.  No family history on file.  Social History     Tobacco Use    Smoking status: Never    Smokeless tobacco: Never   Vaping Use    Vaping Use: Never used   Substance Use Topics    Alcohol use: Not Currently    Drug use: Never       Patient Active Problem List    Diagnosis Date Noted    Anxiety 2021    Attention deficit hyperactivity disorder (ADHD) 2021    Type 1 diabetes mellitus (HCC) 10/18/2019       Current Outpatient Medications   Medication Sig Dispense Refill    amphetamine-dextroamphetamine (ADDERALL XR) 15 MG XR capsule Take 1 Capsule by mouth every morning for 30 days. 30 Capsule 0    insulin lispro (HUMALOG KWIKPEN) 100 UNIT/ML SC SOPN injection PEN INJECT UP TO 70 UNITS SUBCUTANEOUSLY DAILY OR AS DIRECTED 3 mL 0    Insulin Glargine, 1 Unit Dial, (TOUJEO SOLOSTAR) 300 UNIT/ML Solution Pen-injector Inject  Units under the skin every evening. 3 mL 0    insulin aspart (NOVOLOG FLEXPEN) 100 UNIT/ML injection PEN Use as directed, up to 70 units daily 30 mL 3    FREESTYLE LITE strip Use as directed to check blood sugars. 200 Strip 3    BD PEN NEEDLE MICRO U/F 32G X 6 MM Misc       insulin aspart (NOVOLOG FLEXPEN) 100 UNIT/ML injection PEN INJECT UP TO 70 UNITS DAILY AS DIRECTED 3 mL 5    Insulin Glargine, 2 Unit Dial, (TOUJEO  "MAX SOLOSTAR) 300 UNIT/ML Solution Pen-injector INJECT 70  UNITS UNDER THE SKIN NIGHTLY 3 mL 5     No current facility-administered medications for this visit.    (including changes today)  Allergies: Patient has no known allergies.    Review of Systems   Constitutional: Negative for fever, chills and malaise/fatigue.   HENT: Negative for congestion.    Eyes: Negative for pain.   Respiratory: Negative for cough and shortness of breath.    Cardiovascular: Negative for leg swelling.   Gastrointestinal: Negative for nausea, vomiting, abdominal pain and diarrhea.   Genitourinary: Negative for dysuria and hematuria.   Skin: Negative for rash.   Neurological: Negative for dizziness, focal weakness and headaches.   Endo/Heme/Allergies: Does not bruise/bleed easily.   Psychiatric/Behavioral: Negative for depression.  The patient is not nervous/anxious.      Objective:     /70 (BP Location: Left arm, Patient Position: Sitting, BP Cuff Size: Adult)   Pulse 78   Temp 36.9 °C (98.4 °F)   Resp 12   Ht 1.803 m (5' 11\")   Wt 89.7 kg (197 lb 12.8 oz)   SpO2 98%   BMI 27.59 kg/m²   Body mass index is 27.59 kg/m².  Wt Readings from Last 4 Encounters:   10/20/23 89.7 kg (197 lb 12.8 oz)   10/12/22 87.9 kg (193 lb 12.8 oz)   03/02/22 85.7 kg (189 lb)   08/26/21 90.7 kg (200 lb)       Physical Exam:  Constitutional: Well-developed and well-nourished. Not diaphoretic. No distress.   Skin: Skin is warm and dry. No rash noted.  Head: Atraumatic without lesions.  Eyes: Conjunctivae and extraocular motions are normal. Pupils are equal, round, and reactive to light. No scleral icterus.   Ears:  External ears unremarkable.   Nose: Nares patent. Septum midline.   Mouth/Throat: Dentition is normal. Tongue normal. Oropharynx is clear and moist. Posterior pharynx without erythema or exudates.  Neck: Supple, trachea midline. Normal range of motion. No thyromegaly present. No lymphadenopathy--cervical or " supraclavicular.  Cardiovascular: Regular rate and rhythm, S1 and S2 without murmur, rubs, or gallops.    Chest: Effort normal. Clear to auscultation throughout.  Monofilament testing with a 10 gram force: sensation intact: intact bilaterally  Visual Inspection: Feet without maceration, ulcers, fissures.  Pedal pulses: intact bilaterally  Extremities: No cyanosis, clubbing, erythema, nor edema. Distal pulses intact and symmetric.   Musculoskeletal: All major joints AROM full in all directions without pain.  Neurological: Moves all 4 extremities.  No facial droop.  Psychiatric:  Behavior, mood, and affect are appropriate.    Assessment and Plan:     1. Well adult exam  Health maintenance reviewed and updated.  Age-appropriate anticipatory guidance provided.  - CBC WITHOUT DIFFERENTIAL; Future  - Comp Metabolic Panel; Future  - HEP C VIRUS ANTIBODY; Future  - HIV AG/AB COMBO ASSAY SCREENING; Future    2. Type 1 diabetes mellitus without complication (HCC)  AC today 5.9%.  He has not been seen by endocrinology for some time and needs new referral.  Denies lows.  He has good understanding of disease and does modify his insulin regimen to fit diet and exercise patterns.  Labs ordered as below.  - Referral to Endocrinology  - POCT Hemoglobin A1C  - CBC WITHOUT DIFFERENTIAL; Future  - Comp Metabolic Panel; Future  - Lipid Profile; Future  - MICROALBUMIN CREAT RATIO URINE; Future  - TSH WITH REFLEX TO FT4; Future  - Lipoprotein (a); Future  - Insulin Glargine, 1 Unit Dial, (TOUJEO SOLOSTAR) 300 UNIT/ML Solution Pen-injector; Inject  Units under the skin every evening.  Dispense: 3 mL; Refill: 0  - insulin lispro (HUMALOG KWIKPEN) 100 UNIT/ML SC SOPN injection PEN; INJECT UP TO 70 UNITS SUBCUTANEOUSLY DAILY OR AS DIRECTED  Dispense: 3 mL; Refill: 0  - insulin aspart (NOVOLOG FLEXPEN) 100 UNIT/ML injection PEN; INJECT UP TO 70 UNITS DAILY AS DIRECTED  Dispense: 3 mL; Refill: 0  - insulin aspart (NOVOLOG FLEXPEN) 100  UNIT/ML injection PEN; Use as directed, up to 70 units daily  Dispense: 3 mL; Refill: 3  - Diabetic Monofilament LE Exam    3. Attention deficit hyperactivity disorder (ADHD), unspecified ADHD type  Chronic and stable, well controlled.  PDMP reviewed.  Refills for 3 months sent.  Collecting contract and UDS.  - Controlled Substance Treatment Agreement  - amphetamine-dextroamphetamine (ADDERALL XR) 15 MG XR capsule; Take 1 Capsule by mouth every morning for 30 days.  Dispense: 30 Capsule; Refill: 0  - amphetamine-dextroamphetamine (ADDERALL XR) 15 MG XR capsule; Take 1 Capsule by mouth every morning for 30 days.  Dispense: 30 Capsule; Refill: 0  - amphetamine-dextroamphetamine (ADDERALL XR) 15 MG XR capsule; Take 1 Capsule by mouth every morning for 30 days.  Dispense: 30 Capsule; Refill: 0  - URINE DRUG SCREEN; Future    4. Screening for viral disease  - HEP C VIRUS ANTIBODY; Future  - HIV AG/AB COMBO ASSAY SCREENING; Future    5. Elevated LDL cholesterol level  Further stratify risk with lipoprotein a.  - Lipoprotein (a); Future        Follow-up: No follow-ups on file.    Please note that this note was created using voice recognition software.

## 2023-10-25 ENCOUNTER — TELEPHONE (OUTPATIENT)
Dept: MEDICAL GROUP | Facility: LAB | Age: 33
End: 2023-10-25
Payer: COMMERCIAL

## 2023-10-25 NOTE — TELEPHONE ENCOUNTER
MEDICATION PRIOR AUTHORIZATION NEEDED:    1. Name of Medication: insulin aspart (NOVOLOG FLEXPEN) 100 UNIT/ML injection PEN    2. Requested By (Name of Pharmacy):   [facility]  P: [phone]  F: [phone]     3. Is insurance on file current? Yes    4. What is the name & phone number of the 3rd party payor?   [facility]/[place]  P: [phone]  F: [phone]    Prior auth submitted to plan through CoverMyMeds, waiting for possible approval.   Key: [id number]  Case ID: [id number]

## 2023-11-08 NOTE — TELEPHONE ENCOUNTER
FINAL PRIOR AUTHORIZATION STATUS:    1.  Name of Medication & Dose:  insulin aspart (NOVOLOG FLEXPEN) 100 UNIT/ML injection PEN    2. Prior Auth Status: N/A outcome, cancelled by insurance.    3. Action Taken: Pharmacy Notified: no Patient Notified: no

## 2023-12-12 DIAGNOSIS — E10.9 TYPE 1 DIABETES MELLITUS WITHOUT COMPLICATION (HCC): ICD-10-CM

## 2023-12-12 RX ORDER — INSULIN LISPRO 100 [IU]/ML
INJECTION, SOLUTION INTRAVENOUS; SUBCUTANEOUS
Qty: 30 ML | Refills: 5 | Status: SHIPPED | OUTPATIENT
Start: 2023-12-12

## 2023-12-12 RX ORDER — INSULIN GLARGINE 300 U/ML
INJECTION, SOLUTION SUBCUTANEOUS
Qty: 6 ML | Refills: 5 | Status: SHIPPED
Start: 2023-12-12 | End: 2024-03-06

## 2023-12-19 ENCOUNTER — TELEPHONE (OUTPATIENT)
Dept: ENDOCRINOLOGY | Facility: MEDICAL CENTER | Age: 33
End: 2023-12-19

## 2023-12-19 ENCOUNTER — PHARMACY VISIT (OUTPATIENT)
Dept: PHARMACY | Facility: MEDICAL CENTER | Age: 33
End: 2023-12-19
Payer: COMMERCIAL

## 2023-12-19 ENCOUNTER — OFFICE VISIT (OUTPATIENT)
Dept: ENDOCRINOLOGY | Facility: MEDICAL CENTER | Age: 33
End: 2023-12-19
Attending: INTERNAL MEDICINE
Payer: COMMERCIAL

## 2023-12-19 VITALS
OXYGEN SATURATION: 95 % | RESPIRATION RATE: 18 BRPM | DIASTOLIC BLOOD PRESSURE: 68 MMHG | SYSTOLIC BLOOD PRESSURE: 122 MMHG | BODY MASS INDEX: 28 KG/M2 | WEIGHT: 200 LBS | HEART RATE: 94 BPM | HEIGHT: 71 IN

## 2023-12-19 DIAGNOSIS — E53.8 B12 DEFICIENCY: ICD-10-CM

## 2023-12-19 DIAGNOSIS — E16.2 HYPOGLYCEMIA: ICD-10-CM

## 2023-12-19 DIAGNOSIS — E10.9 TYPE 1 DIABETES MELLITUS WITHOUT COMPLICATION (HCC): ICD-10-CM

## 2023-12-19 DIAGNOSIS — E55.9 VITAMIN D DEFICIENCY: ICD-10-CM

## 2023-12-19 DIAGNOSIS — Z79.4 LONG-TERM INSULIN USE (HCC): ICD-10-CM

## 2023-12-19 PROCEDURE — 99205 OFFICE O/P NEW HI 60 MIN: CPT | Performed by: INTERNAL MEDICINE

## 2023-12-19 PROCEDURE — 3074F SYST BP LT 130 MM HG: CPT | Performed by: INTERNAL MEDICINE

## 2023-12-19 PROCEDURE — 99204 OFFICE O/P NEW MOD 45 MIN: CPT | Performed by: INTERNAL MEDICINE

## 2023-12-19 PROCEDURE — RXMED WILLOW AMBULATORY MEDICATION CHARGE: Performed by: INTERNAL MEDICINE

## 2023-12-19 PROCEDURE — 95251 CONT GLUC MNTR ANALYSIS I&R: CPT | Performed by: INTERNAL MEDICINE

## 2023-12-19 PROCEDURE — 3078F DIAST BP <80 MM HG: CPT | Performed by: INTERNAL MEDICINE

## 2023-12-19 RX ORDER — INSULIN DEGLUDEC 100 U/ML
100 INJECTION, SOLUTION SUBCUTANEOUS
Qty: 30 ML | Refills: 6 | Status: SHIPPED
Start: 2023-12-19 | End: 2024-03-06

## 2023-12-19 RX ORDER — GLUCAGON 3 MG/1
3 POWDER NASAL
Qty: 1 EACH | Refills: 11 | Status: SHIPPED | OUTPATIENT
Start: 2023-12-19

## 2023-12-19 RX ORDER — INSULIN HUMAN 4-8-12(60)
4-12 KIT INHALATION 3 TIMES DAILY
Qty: 360 EACH | Refills: 0 | Status: SHIPPED | OUTPATIENT
Start: 2023-12-19 | End: 2024-02-13

## 2023-12-19 NOTE — PROGRESS NOTES
"Chief Complaint:  Consult requested by Mahesh Fitzgerald M.D. for initial evaluation of Type 1 Diabetes Mellitus    HPI:   Marquise Peacock is a 33 y.o. male with Type 1 Diabetes Mellitus diagnosed at the age of 11.  He denies hospitalizations for DKA in the past.    His A1c is  Toujeo 44u daily  Humalog carb ratio 1:12 grams of carbs  Correction scale 1:50> 100    He has been using a Dexcom G6 CGM     He monitors blood glucose  4 times per day. Blood glucose values range:122 with TIR 66%    He reports hypoglycemic episodes occurring 1 times per day and are mild  He  denies hypoglycemic unawareness. He denies episodes of severe hypoglycemia requiring third party assistance.  He  is not wearing a medical alert bracelet or necklace.  He does not a glucagon emergency kit.    He reports attending diabetes education classes.  Diet: \"healthy\" diet  in general.    He works as a [employer] manager  But he works out regularly 2 x a week    Diabetes Complications   He  denies history of retinopathy.  He denies laser eye surgery. Last eye exam: March 2023.  He denies history of peripheral sensory neuropathy.  He denies numbness, tingling in both feet.  He denies history of foot sores.   He denies history of kidney damage.  He is not on ACE inhibitor or ARB.   He denies history of coronary artery disease.  He  denies history of stroke and denies TIA.  He denies history of PAD.  He denies history of hyperlipidemia.      ROS:     CONS:     No fever, no chills, no weight loss, no fatigue   EYES:      No diplopia, no blurry vision, no redness of eyes, no swelling of eyelids   ENT:    No hearing loss, No ear pain, No sore throat, no dysphagia, no neck swelling   CV:     No chest pain, no palpitations, no claudication, no orthopnea, no PND   PULM:    No SOB, no cough, no hemoptysis, no wheezing    GI:   No nausea, no vomiting, no diarrhea, no constipation, no bloody stools   :  Passing urine well, no dysuria, no hematuria "   ENDO:   No polyuria, no polydipsia, no heat intolerance, no cold intolerance   NEURO: No headaches, no dizziness, no convulsions, no tremors   MUSC:  No joint swellings, no arthralgias, no myalgias, no weakness   SKIN:   No rash, no ulcers, no dry skin   PSYCH:   No depression, no anxiety, no difficulty sleeping       Past Medical History:  Patient Active Problem List    Diagnosis Date Noted    Anxiety 09/03/2021    Attention deficit hyperactivity disorder (ADHD) 09/03/2021    Type 1 diabetes mellitus (HCC) 10/18/2019       Past Surgical History:  No past surgical history on file.     Allergies:  Patient has no known allergies.     Current Medications:    Current Outpatient Medications:     glucose blood strip, FreeStyle Lite Strips, Disp: , Rfl:     Insulin Glargine, 2 Unit Dial, (TOUJEO MAX SOLOSTAR) 300 UNIT/ML Solution Pen-injector, INJECT 70  UNITS UNDER THE SKIN NIGHTLY, Disp: 6 mL, Rfl: 5    insulin lispro (HUMALOG KWIKPEN) 100 UNIT/ML SC SOPN injection PEN, INJECT UP TO 70 UNITS SUBCUTANEOUSLY DAILY OR AS DIRECTED, Disp: 30 mL, Rfl: 5    Insulin Glargine, 1 Unit Dial, (TOUJEO SOLOSTAR) 300 UNIT/ML Solution Pen-injector, Inject  Units under the skin every evening., Disp: 3 mL, Rfl: 0    insulin aspart (NOVOLOG FLEXPEN) 100 UNIT/ML injection PEN, INJECT UP TO 70 UNITS DAILY AS DIRECTED, Disp: 3 mL, Rfl: 0    amphetamine-dextroamphetamine (ADDERALL XR) 15 MG XR capsule, Take 1 Capsule by mouth every morning for 30 days., Disp: 30 Capsule, Rfl: 0    FREESTYLE LITE strip, Use as directed to check blood sugars., Disp: 200 Strip, Rfl: 3    BD PEN NEEDLE MICRO U/F 32G X 6 MM Misc, , Disp: , Rfl:     Amphetamine-Dextroamphetamine (ADDERALL PO), , Disp: , Rfl:     insulin aspart (NOVOLOG FLEXPEN) 100 UNIT/ML injection PEN, Use as directed, up to 70 units daily (Patient not taking: Reported on 12/19/2023), Disp: 3 mL, Rfl: 3    [START ON 1/8/2024] amphetamine-dextroamphetamine (ADDERALL XR) 15 MG XR  "capsule, Take 1 Capsule by mouth every morning for 30 days. (Patient not taking: Reported on 12/19/2023), Disp: 30 Capsule, Rfl: 0    Social History:  Social History     Socioeconomic History    Marital status:      Spouse name: Not on file    Number of children: Not on file    Years of education: Not on file    Highest education level: Not on file   Occupational History    Not on file   Tobacco Use    Smoking status: Never    Smokeless tobacco: Never   Vaping Use    Vaping Use: Never used   Substance and Sexual Activity    Alcohol use: Not Currently    Drug use: Never    Sexual activity: Not on file   Other Topics Concern    Not on file   Social History Narrative    Not on file     Social Determinants of Health     Financial Resource Strain: Not on file   Food Insecurity: Not on file   Transportation Needs: Not on file   Physical Activity: Sufficiently Active (10/19/2023)    Exercise Vital Sign     Days of Exercise per Week: 3 days     Minutes of Exercise per Session: 50 min   Stress: Not on file   Social Connections: Not on file   Intimate Partner Violence: Not on file   Housing Stability: Not on file        Family History:   No family history on file.      PHYSICAL EXAM:   Vital signs: /68 (BP Location: Left arm, Patient Position: Sitting, BP Cuff Size: Adult)   Pulse 94   Resp 18   Ht 1.803 m (5' 11\")   Wt 90.7 kg (200 lb)   SpO2 95%   BMI 27.89 kg/m²   GENERAL: Well-developed, well-nourished  in no apparent distress.   EYE: No ocular and eyelid asymmetry, Anicteric sclerae,  PERRL, No exophthalmos or lidlag  HENT: Hearing grossly intact, Normocephalic, atraumatic. Pink, moist mucous membranes, No exudate  NECK: Supple. Trachea midline. thyroid is normal in size without nodules or tenderness  CARDIOVASCULAR: Regular rate and rhythm. No murmurs, rubs, or gallops.   LUNGS: Clear to auscultation bilaterally   ABDOMEN: Soft, nontender with positive bowel sounds.   EXTREMITIES: No clubbing, " "cyanosis, or edema.   NEUROLOGICAL: Cranial nerves II-XII are grossly intact   Symmetric reflexes at the patella no proximal muscle weakness, No visible tremor with both outstretched hands  LYMPH: No cervical, supraclavicular,  adenopathy palpated.   SKIN: No rashes, lesions. Turgor is normal.  FOOT: Normal sensation to monofilament testing, normal pulses, no ulcers.  Normal Vibration quantitative sensation test.    Labs:  Lab Results   Component Value Date/Time    HBA1C 5.9 (A) 10/20/2023 0924    AVGLUC 154 10/21/2019 0921       Lab Results   Component Value Date/Time    WBC 9.2 10/21/2019 09:21 AM    RBC 4.97 10/21/2019 09:21 AM    HEMOGLOBIN 15.5 10/21/2019 09:21 AM    MCV 95.0 10/21/2019 09:21 AM    MCH 31.2 10/21/2019 09:21 AM    MCHC 32.8 (L) 10/21/2019 09:21 AM    RDW 41.4 10/21/2019 09:21 AM    MPV 10.3 10/21/2019 09:21 AM       Lab Results   Component Value Date/Time    SODIUM 141 10/21/2019 09:21 AM    POTASSIUM 3.9 10/21/2019 09:21 AM    CHLORIDE 106 10/21/2019 09:21 AM    CO2 28 10/21/2019 09:21 AM    ANION 7.0 10/21/2019 09:21 AM    GLUCOSE 54 (L) 10/21/2019 09:21 AM    BUN 20 10/21/2019 09:21 AM    CREATININE 0.71 10/21/2019 09:21 AM    CALCIUM 9.3 10/21/2019 09:21 AM    ASTSGOT 16 10/21/2019 09:21 AM    ALTSGPT 12 10/21/2019 09:21 AM    TBILIRUBIN 0.7 10/21/2019 09:21 AM    ALBUMIN 4.3 10/21/2019 09:21 AM    TOTPROTEIN 7.0 10/21/2019 09:21 AM    GLOBULIN 2.7 10/21/2019 09:21 AM    AGRATIO 1.6 10/21/2019 09:21 AM       Lab Results   Component Value Date/Time    CHOLSTRLTOT 186 10/21/2019 0921    TRIGLYCERIDE 40 10/21/2019 0921    HDL 57 10/21/2019 0921     (H) 10/21/2019 0921       Lab Results   Component Value Date/Time    MALBCRT 6 10/21/2019 09:21 AM    MICROALBUR 1.3 10/21/2019 09:21 AM        No results found for: \"TSHULTRASEN\"  No results found for: \"FREEDIR\"  No results found for: \"FREET3\"  No results found for: \"THYSTIMIG\"        ASSESSMENT/PLAN:     1. Type 1 diabetes mellitus without " complication (HCC)  Fairly controlled  He has a few episodes of hypoglycemia and this is occurring after he is treating highs.  We discussed trying inhaled insulin or Afrezza instead of NovoLog boluses for treating highs because of the low risk of hypoglycemia with Afrezza.  Reviewed side effects of Afrezza which includes cough and hypoglycemia and the rapid onset of action of Afrezza and rapid removal from the body  He is going to get trained on inhaled insulin by Divya Wilson.  He is a good candidate for Afrezza because he is a non-smoker and does not have chronic lung disease  We will also replace Toujeo with Tresiba  In the future he may decide to use Afrezza as his bolus insulin and get rid of NovoLog completely  I am ordering glucagon for him  We also downloaded his Dexcom G6 CGM but I am switching him to the Dexcom G7  Will send the Dexcom G7 to [facility]  I also want him to get updated labs to check his kidney function urine albumin TSH and also screen him for other autoimmune diseases because he has type 1 diabetes I will see him again in 3 months        2. Hypoglycemia  See plan above  We are also ordering inhaled glucagon     3. B12 deficiency  stable we will check B12 levels with upcoming labs and screen for autoimmune gastritis    4. Vitamin D deficiency  Stable we will check 25-hydroxy vitamin D levels with upcoming labs and screen for celiac disease    5. Long-term insulin use (HCC)  Patient is on multiple daily insulin injections for type 1 diabetes      Return in about 3 months (around 3/19/2024).      Total time spent on day of service was over 60 minutes which included obtaining a detailed history and physical exam, ordering labs, coordinating care and scheduling future follow-up     This patient during there office visit was started on new medication.  Side effects of new medications were discussed with the patient today in the office. The patient was supplied paperwork on this new  medication.    Thank you kindly for allowing me to participate in the diabetes care plan for this patient.    Santosh Pitt MD, [facility], [facility]  12/19/23    CC:   Mahesh Fitzgerald M.D.

## 2023-12-19 NOTE — TELEPHONE ENCOUNTER
Received PA request via [employer] for Tresiba Flextouch 100 units/ml (Insulin degludec).    $34.98-30mls/30D with voucher  $104.98-90mls/90D with Voucher     Insurance:[facility]    Pharmacy on File  [id number] - [place] - [address]  [address] [facility] [place] [address]  Phone: [phone]  Fax: [phone]    Is patient eligible to fill with [facility]? Yes    FA-Voucher applied    Next Steps:  Sending to outreach

## 2023-12-19 NOTE — TELEPHONE ENCOUNTER
Attempted to contact patient at [phone] to discuss [facility] and services/benefits offered. No answer, left voicemail.    Sujatha Vaughan

## 2024-01-01 ENCOUNTER — PATIENT MESSAGE (OUTPATIENT)
Dept: MEDICAL GROUP | Facility: LAB | Age: 34
End: 2024-01-01

## 2024-01-01 ENCOUNTER — OFFICE VISIT (OUTPATIENT)
Dept: URGENT CARE | Facility: CLINIC | Age: 34
End: 2024-01-01
Payer: COMMERCIAL

## 2024-01-01 VITALS
DIASTOLIC BLOOD PRESSURE: 82 MMHG | RESPIRATION RATE: 16 BRPM | BODY MASS INDEX: 26.6 KG/M2 | SYSTOLIC BLOOD PRESSURE: 120 MMHG | OXYGEN SATURATION: 98 % | HEART RATE: 58 BPM | WEIGHT: 190 LBS | HEIGHT: 71 IN | TEMPERATURE: 98.7 F

## 2024-01-01 DIAGNOSIS — J02.9 SORE THROAT: ICD-10-CM

## 2024-01-01 DIAGNOSIS — J02.0 STREP THROAT: ICD-10-CM

## 2024-01-01 DIAGNOSIS — E10.9 TYPE 1 DIABETES MELLITUS WITHOUT COMPLICATION (HCC): ICD-10-CM

## 2024-01-01 LAB
FLUAV RNA SPEC QL NAA+PROBE: NEGATIVE
FLUBV RNA SPEC QL NAA+PROBE: NEGATIVE
RSV RNA SPEC QL NAA+PROBE: NEGATIVE
S PYO DNA SPEC NAA+PROBE: DETECTED
SARS-COV-2 RNA RESP QL NAA+PROBE: NEGATIVE

## 2024-01-01 PROCEDURE — 87651 STREP A DNA AMP PROBE: CPT | Performed by: PHYSICIAN ASSISTANT

## 2024-01-01 PROCEDURE — 3074F SYST BP LT 130 MM HG: CPT | Performed by: PHYSICIAN ASSISTANT

## 2024-01-01 PROCEDURE — 99213 OFFICE O/P EST LOW 20 MIN: CPT | Performed by: PHYSICIAN ASSISTANT

## 2024-01-01 PROCEDURE — 3079F DIAST BP 80-89 MM HG: CPT | Performed by: PHYSICIAN ASSISTANT

## 2024-01-01 PROCEDURE — 0241U POCT CEPHEID COV-2, FLU A/B, RSV - PCR: CPT | Performed by: PHYSICIAN ASSISTANT

## 2024-01-01 RX ORDER — AMOXICILLIN 500 MG/1
500 CAPSULE ORAL 2 TIMES DAILY
Qty: 20 CAPSULE | Refills: 0 | Status: SHIPPED | OUTPATIENT
Start: 2024-01-01 | End: 2024-01-11

## 2024-01-01 ASSESSMENT — ENCOUNTER SYMPTOMS
HOARSE VOICE: 0
COUGH: 0
TROUBLE SWALLOWING: 0
SWOLLEN GLANDS: 1

## 2024-01-01 NOTE — RESULT ENCOUNTER NOTE
Drew Camarillo,    Your testing for strep throat was positive.  I sent in antibiotics to treat this.  After you have been on the antibiotics for 2 days please change your toothbrush and wash any reasonable water bottle so that you do not reinfect yourself.  You may also take ibuprofen as needed for pain.    Avoid sharing food and drinks to limit the spread, as strep throat is very contagious.    Feel better soon!  Cecil

## 2024-01-01 NOTE — PROGRESS NOTES
Subjective:   Marquise Peacock is a 33 y.o. male who presents for Pharyngitis (X 24hours)        Pharyngitis   This is a new problem. The current episode started yesterday. The problem has been gradually worsening. Maximum temperature: tactile fever. The pain is moderate. Associated symptoms include swollen glands. Pertinent negatives include no congestion, coughing, drooling, ear pain, hoarse voice or trouble swallowing. Associated symptoms comments: Pain with swallowing. He has had no exposure to strep or mono. Treatments tried: dayquil. The treatment provided no relief.     Review of Systems   HENT:  Negative for congestion, drooling, ear pain, hoarse voice and trouble swallowing.    Respiratory:  Negative for cough.        PMH:  has a past medical history of Diabetes ([facility]).  MEDS:   Current Outpatient Medications:     amoxicillin (AMOXIL) 500 MG Cap, Take 1 Capsule by mouth 2 times a day for 10 days., Disp: 20 Capsule, Rfl: 0    glucose blood strip, FreeStyle Lite Strips, Disp: , Rfl:     Insulin Regular Human (AFREZZA) 60x4 &60x8 & 60x12 UNIT Powder, Inhale 4-12 Units in the morning, at noon, and at bedtime. Sample only, Disp: 360 Each, Rfl: 0    Glucagon (BAQSIMI ONE PACK) 3 mg/dose, Administer 1 Spray into one nostril 1 time a day as needed (for severe hypoglycemia)., Disp: 1 Each, Rfl: 11    Insulin Degludec (TRESIBA FLEXTOUCH) 100 UNIT/ML Solution Pen-injector, Inject 100 Units under the skin at bedtime., Disp: 30 mL, Rfl: 6    Insulin Glargine, 2 Unit Dial, (TOUJEO MAX SOLOSTAR) 300 UNIT/ML Solution Pen-injector, INJECT 70  UNITS UNDER THE SKIN NIGHTLY, Disp: 6 mL, Rfl: 5    insulin lispro (HUMALOG KWIKPEN) 100 UNIT/ML SC SOPN injection PEN, INJECT UP TO 70 UNITS SUBCUTANEOUSLY DAILY OR AS DIRECTED, Disp: 30 mL, Rfl: 5    Insulin Glargine, 1 Unit Dial, (TOUJEO SOLOSTAR) 300 UNIT/ML Solution Pen-injector, Inject  Units under the skin every evening., Disp: 3 mL, Rfl: 0    insulin aspart  "(NOVOLOG FLEXPEN) 100 UNIT/ML injection PEN, INJECT UP TO 70 UNITS DAILY AS DIRECTED, Disp: 3 mL, Rfl: 0    amphetamine-dextroamphetamine (ADDERALL XR) 15 MG XR capsule, Take 1 Capsule by mouth every morning for 30 days., Disp: 30 Capsule, Rfl: 0    FREESTYLE LITE strip, Use as directed to check blood sugars., Disp: 200 Strip, Rfl: 3    BD PEN NEEDLE MICRO U/F 32G X 6 MM Misc, , Disp: , Rfl:   ALLERGIES: No Known Allergies  SURGHX: No past surgical history on file.  SOCHX:  reports that he has never smoked. He has never used smokeless tobacco. He reports current alcohol use. He reports that he does not use drugs.  FH: Family history was reviewed, no pertinent findings to report   Objective:   /82   Pulse (!) 58   Temp 37.1 °C (98.7 °F) (Temporal)   Resp 16   Ht 1.803 m (5' 11\")   Wt 86.2 kg (190 lb)   SpO2 98%   BMI 26.50 kg/m²   Physical Exam  Vitals reviewed.   Constitutional:       General: He is not in acute distress.     Appearance: Normal appearance. He is well-developed. He is not toxic-appearing.   HENT:      Head: Normocephalic and atraumatic.      Right Ear: Tympanic membrane, ear canal and external ear normal.      Left Ear: Tympanic membrane, ear canal and external ear normal.      Nose: Nose normal. No congestion or rhinorrhea.      Mouth/Throat:      Lips: Pink.      Mouth: Mucous membranes are moist.      Pharynx: Oropharynx is clear. Uvula midline. Posterior oropharyngeal erythema present. No oropharyngeal exudate or uvula swelling.   Cardiovascular:      Rate and Rhythm: Normal rate and regular rhythm.      Heart sounds: Normal heart sounds, S1 normal and S2 normal.   Pulmonary:      Effort: Pulmonary effort is normal. No respiratory distress.      Breath sounds: Normal breath sounds. No stridor. No decreased breath sounds, wheezing, rhonchi or rales.   Skin:     General: Skin is dry.   Neurological:      Comments: Alert and oriented.    Psychiatric:         Speech: Speech normal.       "   Behavior: Behavior normal.           Results for orders placed or performed in visit on 01/01/24   POCT GROUP A STREP, PCR   Result Value Ref Range    POC Group A Strep, PCR Detected (A) Not Detected, Invalid       Assessment/Plan:   1. Strep throat  - amoxicillin (AMOXIL) 500 MG Cap; Take 1 Capsule by mouth 2 times a day for 10 days.  Dispense: 20 Capsule; Refill: 0    2. Sore throat  - POCT GROUP A STREP, PCR  - POCT CoV-2, Flu A/B, RSV by PCR    Considerations include but not limited to: GAS, infectious mononucleosis, other bacterial pharyngitis, viral uri, GERD, allergic pharyngitis. No evidence of peritonsillar abscess, retropharyngeal abscess of other deep space infection on exam today.     Testing for group A strep is positive.  We will tx with abx.    Pt instructed to complete full course of medication despite symptomatic improvement. Pt to take med with meals to alleviate GI upset.  Recommend taking a probiotic concurrently.    You are contagious for 24hrs after starting abx.  Recommend good hand hygiene and refraining from sharing food or drinks.  Change toothbrush 48 hours after beginning the antibiotics to avoid reinfection.  I also recommend sanitizing any reusable water bottles.      Salt water gargles, hot tea with honey, lozenges and ibuprofen as needed for pain.      If symptoms fail to improve within 48 hours, new symptoms develop, symptoms worsen see primary care provider or return to clinic for reevaluation.    If patient develops severe symptoms such as drooling/difficulty swallowing, difficulty opening their mouth, facial/neck redness or swelling, audible stridor or wheezing, difficulty moving their neck or other severe and concerning symptoms-I recommend that they go to the emergency room for further evaluation and management.

## 2024-01-03 ENCOUNTER — PATIENT MESSAGE (OUTPATIENT)
Dept: MEDICAL GROUP | Facility: LAB | Age: 34
End: 2024-01-03
Payer: COMMERCIAL

## 2024-01-03 DIAGNOSIS — E10.9 TYPE 1 DIABETES MELLITUS WITHOUT COMPLICATION (HCC): ICD-10-CM

## 2024-01-03 DIAGNOSIS — E78.00 ELEVATED LDL CHOLESTEROL LEVEL: ICD-10-CM

## 2024-01-03 RX ORDER — ROSUVASTATIN CALCIUM 5 MG/1
5 TABLET, COATED ORAL EVERY EVENING
Qty: 90 TABLET | Refills: 3 | Status: SHIPPED | OUTPATIENT
Start: 2024-01-03 | End: 2024-01-17 | Stop reason: SDUPTHER

## 2024-01-17 RX ORDER — ROSUVASTATIN CALCIUM 5 MG/1
5 TABLET, COATED ORAL EVERY EVENING
Qty: 90 TABLET | Refills: 3 | Status: SHIPPED | OUTPATIENT
Start: 2024-01-17

## 2024-01-18 NOTE — PROGRESS NOTES
Received request via: Patient    Was the patient seen in the last year in this department? Yes    Does the patient have an active prescription (recently filled or refills available) for medication(s) requested? Yes.  He is asking for this to go to a different pharmacy.    Does the patient have [facility] and need 100 day supply (blood pressure, diabetes and cholesterol meds only)? Patient does not have SCP

## 2024-02-07 ENCOUNTER — TELEPHONE (OUTPATIENT)
Dept: ENDOCRINOLOGY | Facility: MEDICAL CENTER | Age: 34
End: 2024-02-07

## 2024-02-07 ENCOUNTER — HOSPITAL ENCOUNTER (OUTPATIENT)
Dept: LAB | Facility: MEDICAL CENTER | Age: 34
End: 2024-02-07
Attending: FAMILY MEDICINE
Payer: COMMERCIAL

## 2024-02-07 ENCOUNTER — HOSPITAL ENCOUNTER (OUTPATIENT)
Dept: LAB | Facility: MEDICAL CENTER | Age: 34
End: 2024-02-07
Attending: INTERNAL MEDICINE
Payer: COMMERCIAL

## 2024-02-07 DIAGNOSIS — E78.00 ELEVATED LDL CHOLESTEROL LEVEL: ICD-10-CM

## 2024-02-07 DIAGNOSIS — Z00.00 WELL ADULT EXAM: ICD-10-CM

## 2024-02-07 DIAGNOSIS — E10.9 TYPE 1 DIABETES MELLITUS WITHOUT COMPLICATION (HCC): ICD-10-CM

## 2024-02-07 DIAGNOSIS — F90.9 ATTENTION DEFICIT HYPERACTIVITY DISORDER (ADHD), UNSPECIFIED ADHD TYPE: ICD-10-CM

## 2024-02-07 DIAGNOSIS — E16.2 HYPOGLYCEMIA: ICD-10-CM

## 2024-02-07 DIAGNOSIS — Z79.4 LONG-TERM INSULIN USE (HCC): ICD-10-CM

## 2024-02-07 DIAGNOSIS — Z11.59 SCREENING FOR VIRAL DISEASE: ICD-10-CM

## 2024-02-07 DIAGNOSIS — E55.9 VITAMIN D DEFICIENCY: ICD-10-CM

## 2024-02-07 DIAGNOSIS — E53.8 B12 DEFICIENCY: ICD-10-CM

## 2024-02-07 LAB
25(OH)D3 SERPL-MCNC: 42 NG/ML (ref 30–100)
ALBUMIN SERPL BCP-MCNC: 5 G/DL (ref 3.2–4.9)
ALBUMIN SERPL BCP-MCNC: 5.1 G/DL (ref 3.2–4.9)
ALBUMIN/GLOB SERPL: 1.4 G/DL
ALBUMIN/GLOB SERPL: 1.5 G/DL
ALP SERPL-CCNC: 83 U/L (ref 30–99)
ALP SERPL-CCNC: 87 U/L (ref 30–99)
ALT SERPL-CCNC: 14 U/L (ref 2–50)
ALT SERPL-CCNC: 19 U/L (ref 2–50)
ANION GAP SERPL CALC-SCNC: 15 MMOL/L (ref 7–16)
ANION GAP SERPL CALC-SCNC: 16 MMOL/L (ref 7–16)
AST SERPL-CCNC: 31 U/L (ref 12–45)
AST SERPL-CCNC: 32 U/L (ref 12–45)
BILIRUB SERPL-MCNC: 0.6 MG/DL (ref 0.1–1.5)
BILIRUB SERPL-MCNC: 0.6 MG/DL (ref 0.1–1.5)
BUN SERPL-MCNC: 26 MG/DL (ref 8–22)
BUN SERPL-MCNC: 27 MG/DL (ref 8–22)
CALCIUM ALBUM COR SERPL-MCNC: 8.9 MG/DL (ref 8.5–10.5)
CALCIUM ALBUM COR SERPL-MCNC: 9.3 MG/DL (ref 8.5–10.5)
CALCIUM SERPL-MCNC: 10.2 MG/DL (ref 8.5–10.5)
CALCIUM SERPL-MCNC: 9.7 MG/DL (ref 8.5–10.5)
CHLORIDE SERPL-SCNC: 103 MMOL/L (ref 96–112)
CHLORIDE SERPL-SCNC: 103 MMOL/L (ref 96–112)
CHOLEST SERPL-MCNC: 176 MG/DL (ref 100–199)
CHOLEST SERPL-MCNC: 180 MG/DL (ref 100–199)
CO2 SERPL-SCNC: 20 MMOL/L (ref 20–33)
CO2 SERPL-SCNC: 21 MMOL/L (ref 20–33)
CORTIS SERPL-MCNC: 19.4 UG/DL (ref 0–23)
CREAT SERPL-MCNC: 0.92 MG/DL (ref 0.5–1.4)
CREAT SERPL-MCNC: 0.98 MG/DL (ref 0.5–1.4)
CREAT UR-MCNC: 277.09 MG/DL
CREAT UR-MCNC: 283.2 MG/DL
ERYTHROCYTE [DISTWIDTH] IN BLOOD BY AUTOMATED COUNT: 41 FL (ref 35.9–50)
FASTING STATUS PATIENT QL REPORTED: NORMAL
GFR SERPLBLD CREATININE-BSD FMLA CKD-EPI: 105 ML/MIN/1.73 M 2
GFR SERPLBLD CREATININE-BSD FMLA CKD-EPI: 113 ML/MIN/1.73 M 2
GLOBULIN SER CALC-MCNC: 3.3 G/DL (ref 1.9–3.5)
GLOBULIN SER CALC-MCNC: 3.6 G/DL (ref 1.9–3.5)
GLUCOSE SERPL-MCNC: 23 MG/DL (ref 65–99)
GLUCOSE SERPL-MCNC: 24 MG/DL (ref 65–99)
HCT VFR BLD AUTO: 49.5 % (ref 42–52)
HCV AB SER QL: NORMAL
HDLC SERPL-MCNC: 75 MG/DL
HDLC SERPL-MCNC: 76 MG/DL
HGB BLD-MCNC: 16.8 G/DL (ref 14–18)
HIV 1+2 AB+HIV1 P24 AG SERPL QL IA: NORMAL
LDLC SERPL CALC-MCNC: 89 MG/DL
LDLC SERPL CALC-MCNC: 96 MG/DL
MCH RBC QN AUTO: 31.9 PG (ref 27–33)
MCHC RBC AUTO-ENTMCNC: 33.9 G/DL (ref 32.3–36.5)
MCV RBC AUTO: 94.1 FL (ref 81.4–97.8)
MICROALBUMIN UR-MCNC: 5.1 MG/DL
MICROALBUMIN UR-MCNC: 5.2 MG/DL
MICROALBUMIN/CREAT UR: 18 MG/G (ref 0–30)
MICROALBUMIN/CREAT UR: 19 MG/G (ref 0–30)
PLATELET # BLD AUTO: 289 K/UL (ref 164–446)
PMV BLD AUTO: 11.2 FL (ref 9–12.9)
POTASSIUM SERPL-SCNC: 4.4 MMOL/L (ref 3.6–5.5)
POTASSIUM SERPL-SCNC: 4.4 MMOL/L (ref 3.6–5.5)
PROT SERPL-MCNC: 8.3 G/DL (ref 6–8.2)
PROT SERPL-MCNC: 8.7 G/DL (ref 6–8.2)
RBC # BLD AUTO: 5.26 M/UL (ref 4.7–6.1)
SODIUM SERPL-SCNC: 138 MMOL/L (ref 135–145)
SODIUM SERPL-SCNC: 140 MMOL/L (ref 135–145)
T4 FREE SERPL-MCNC: 1.28 NG/DL (ref 0.93–1.7)
TRIGL SERPL-MCNC: 47 MG/DL (ref 0–149)
TRIGL SERPL-MCNC: 53 MG/DL (ref 0–149)
TSH SERPL DL<=0.005 MIU/L-ACNC: 1.88 UIU/ML (ref 0.38–5.33)
TSH SERPL DL<=0.005 MIU/L-ACNC: 1.99 UIU/ML (ref 0.38–5.33)
VIT B12 SERPL-MCNC: 480 PG/ML (ref 211–911)
WBC # BLD AUTO: 5.1 K/UL (ref 4.8–10.8)

## 2024-02-07 PROCEDURE — 86803 HEPATITIS C AB TEST: CPT

## 2024-02-07 PROCEDURE — 84439 ASSAY OF FREE THYROXINE: CPT

## 2024-02-07 PROCEDURE — 84443 ASSAY THYROID STIM HORMONE: CPT

## 2024-02-07 PROCEDURE — 84681 ASSAY OF C-PEPTIDE: CPT

## 2024-02-07 PROCEDURE — 83516 IMMUNOASSAY NONANTIBODY: CPT

## 2024-02-07 PROCEDURE — 82043 UR ALBUMIN QUANTITATIVE: CPT | Mod: 91

## 2024-02-07 PROCEDURE — 82306 VITAMIN D 25 HYDROXY: CPT

## 2024-02-07 PROCEDURE — 84443 ASSAY THYROID STIM HORMONE: CPT | Mod: 91

## 2024-02-07 PROCEDURE — 80053 COMPREHEN METABOLIC PANEL: CPT

## 2024-02-07 PROCEDURE — 36415 COLL VENOUS BLD VENIPUNCTURE: CPT

## 2024-02-07 PROCEDURE — 83695 ASSAY OF LIPOPROTEIN(A): CPT

## 2024-02-07 PROCEDURE — 82570 ASSAY OF URINE CREATININE: CPT

## 2024-02-07 PROCEDURE — 86341 ISLET CELL ANTIBODY: CPT

## 2024-02-07 PROCEDURE — 80053 COMPREHEN METABOLIC PANEL: CPT | Mod: 91

## 2024-02-07 PROCEDURE — 82024 ASSAY OF ACTH: CPT

## 2024-02-07 PROCEDURE — 82570 ASSAY OF URINE CREATININE: CPT | Mod: 91

## 2024-02-07 PROCEDURE — 85027 COMPLETE CBC AUTOMATED: CPT

## 2024-02-07 PROCEDURE — 87389 HIV-1 AG W/HIV-1&-2 AB AG IA: CPT

## 2024-02-07 PROCEDURE — 82043 UR ALBUMIN QUANTITATIVE: CPT

## 2024-02-07 PROCEDURE — 80061 LIPID PANEL: CPT | Mod: 91

## 2024-02-07 PROCEDURE — 82533 TOTAL CORTISOL: CPT

## 2024-02-07 PROCEDURE — 82172 ASSAY OF APOLIPOPROTEIN: CPT

## 2024-02-07 PROCEDURE — 82607 VITAMIN B-12: CPT

## 2024-02-07 PROCEDURE — 80061 LIPID PANEL: CPT

## 2024-02-08 LAB — ACTH PLAS-MCNC: 13.3 PG/ML (ref 7.2–63.3)

## 2024-02-08 NOTE — TELEPHONE ENCOUNTER
Received a call from Eldon with [facility]. Eldon states that they ran the patients glucose it came back as 23 they ran it again it was 24.     Called out to patient, no answer left a voicemail to call the office back.     Called patients wife, also left a voicemail to have the patient call the office back.

## 2024-02-09 LAB
APO B100 SERPL-MCNC: 83 MG/DL (ref 66–133)
C PEPTIDE SERPL-MCNC: <0.1 NG/ML (ref 0.5–3.3)
LPA SERPL-MCNC: 10 MG/DL

## 2024-02-10 LAB — GAD65 AB SER IA-ACNC: <5 IU/ML (ref 0–5)

## 2024-02-13 DIAGNOSIS — E10.9 TYPE 1 DIABETES MELLITUS WITHOUT COMPLICATION (HCC): ICD-10-CM

## 2024-02-13 RX ORDER — INSULIN HUMAN 4-8-12(60)
4-12 KIT INHALATION
Qty: 360 EACH | Refills: 1 | Status: SHIPPED | OUTPATIENT
Start: 2024-02-13 | End: 2024-02-14 | Stop reason: SDUPTHER

## 2024-02-14 DIAGNOSIS — E10.9 TYPE 1 DIABETES MELLITUS WITHOUT COMPLICATION (HCC): ICD-10-CM

## 2024-02-14 LAB — 21HYDROXYLASE AB SER QL: NEGATIVE

## 2024-02-14 RX ORDER — INSULIN HUMAN 4-8-12(60)
4-12 KIT INHALATION
Qty: 360 EACH | Refills: 1 | Status: SHIPPED | OUTPATIENT
Start: 2024-02-14 | End: 2024-02-14

## 2024-02-14 RX ORDER — INSULIN HUMAN 4-8-12(60)
4-12 KIT INHALATION 3 TIMES DAILY
Qty: 360 EACH | Refills: 1 | Status: SHIPPED | OUTPATIENT
Start: 2024-02-14

## 2024-02-28 ENCOUNTER — TELEPHONE (OUTPATIENT)
Dept: ENDOCRINOLOGY | Facility: MEDICAL CENTER | Age: 34
End: 2024-02-28
Payer: COMMERCIAL

## 2024-02-28 NOTE — TELEPHONE ENCOUNTER
Prior Authorization for INSULIN DEGLUDEC (TRESIBA FLEXTOUCH) 100 UNIT/ML SOLUTION PEN-INJECTOR (Quantity: 30, Days: 30) has been submitted via Cover My Meds: Key ([id number])    Insurance: [employer]    Will follow up in 24-48 business hours.

## 2024-03-04 NOTE — TELEPHONE ENCOUNTER
Prior Authorization for INSULIN DEGLUDEC (TRESIBA FLEXTOUCH) 100 UNIT/ML SOLUTION PEN-INJECTOR has been denied for a quantity of 30 , day supply 30    Prior authorization was denied per the following: non-formulary. Preferred medications Toujeo or Lantus    Prior Authorization denial reference number: NA  Insurance: [employer]      Next Steps:Would you like to change therapy to Toujeo or Lantus?

## 2024-03-06 DIAGNOSIS — E10.9 TYPE 1 DIABETES MELLITUS WITHOUT COMPLICATION (HCC): ICD-10-CM

## 2024-03-06 RX ORDER — INSULIN GLARGINE 300 U/ML
100 INJECTION, SOLUTION SUBCUTANEOUS DAILY
Qty: 24 ML | Refills: 1 | Status: SHIPPED | OUTPATIENT
Start: 2024-03-06

## 2024-03-07 ENCOUNTER — TELEPHONE (OUTPATIENT)
Dept: PHARMACY | Facility: MEDICAL CENTER | Age: 34
End: 2024-03-07
Payer: COMMERCIAL

## 2024-03-14 ENCOUNTER — TELEPHONE (OUTPATIENT)
Dept: PHARMACY | Facility: MEDICAL CENTER | Age: 34
End: 2024-03-14
Payer: COMMERCIAL

## 2024-03-14 NOTE — TELEPHONE ENCOUNTER
AFREZZA 60X4 &60X8 &60X12 UNIT POWDER    Prior Authorization for Afrezza has been denied for a quantity of 360 , day supply 120    Prior authorization was denied per the following: drug is covered if you meet the followin. (A). you have a disease with high blood sugar levels(Type 1 diabetes mellitus) and Afrezza will be used with a long-acting insulin ( For example: Lantus, Levemir). (B.) you have a disease with high blood sugars levels(type 2 diabetes mellitus) 2. you are unable to self-inject short acting insulin multiple times daily due to one of the following: (A) Physical loss of function. (B) Decreased vision. (C) Abnormal amount of fat under the skin( Lipohypertrophy). 3. you have a verified forced expiratory volume at 1 second within the last 60 days more than or equal to 70% expected normal as determined by the doctor. 4. the drug is prescribed or recommended by a doctor who specializes in endocrine glands(endocrinologist). 5. all of the following: (A) you do not smoke cigarettes. (B) you did not recently quit smoking (within the past 6 months) (C) you do not have long term lung disease(for example: asthma, chronic obstructive pulmonary disease. the info provided dies not show you meet the criteria listed above.    Prior Authorization denial reference number: [id number]  Insurance: [facility]      Next Steps:Proceed with appeal process. Generate proposed appeal for provider approval & signature.

## 2024-03-15 NOTE — TELEPHONE ENCOUNTER
Advised provider of denial. Awaiting his response.     Thank you,  Becky Rosario, [facility]  Endocrinology Pharmacy Coordinator

## 2024-04-01 ENCOUNTER — APPOINTMENT (OUTPATIENT)
Dept: ENDOCRINOLOGY | Facility: MEDICAL CENTER | Age: 34
End: 2024-04-01
Attending: INTERNAL MEDICINE
Payer: COMMERCIAL

## 2024-04-29 ENCOUNTER — OFFICE VISIT (OUTPATIENT)
Dept: ENDOCRINOLOGY | Facility: MEDICAL CENTER | Age: 34
End: 2024-04-29
Attending: INTERNAL MEDICINE
Payer: COMMERCIAL

## 2024-04-29 VITALS
OXYGEN SATURATION: 98 % | SYSTOLIC BLOOD PRESSURE: 128 MMHG | HEIGHT: 71 IN | WEIGHT: 199 LBS | BODY MASS INDEX: 27.86 KG/M2 | HEART RATE: 69 BPM | DIASTOLIC BLOOD PRESSURE: 62 MMHG

## 2024-04-29 DIAGNOSIS — E16.2 HYPOGLYCEMIA: ICD-10-CM

## 2024-04-29 DIAGNOSIS — Z79.4 LONG-TERM INSULIN USE (HCC): ICD-10-CM

## 2024-04-29 DIAGNOSIS — E78.5 DYSLIPIDEMIA: ICD-10-CM

## 2024-04-29 DIAGNOSIS — E10.9 TYPE 1 DIABETES MELLITUS WITHOUT COMPLICATION (HCC): ICD-10-CM

## 2024-04-29 DIAGNOSIS — E55.9 VITAMIN D DEFICIENCY: ICD-10-CM

## 2024-04-29 LAB
HBA1C MFR BLD: 6.1 % (ref ?–5.8)
POCT INT CON NEG: NEGATIVE
POCT INT CON POS: POSITIVE

## 2024-04-29 PROCEDURE — 83036 HEMOGLOBIN GLYCOSYLATED A1C: CPT | Performed by: INTERNAL MEDICINE

## 2024-04-29 RX ORDER — INSULIN HUMAN 4 UNIT(90)
4-8 KIT INHALATION 3 TIMES DAILY
Qty: 180 EACH | Refills: 11 | Status: SHIPPED | OUTPATIENT
Start: 2024-04-29

## 2024-04-29 RX ORDER — INSULIN LISPRO-AABC 100 [IU]/ML
10 INJECTION, SOLUTION SUBCUTANEOUS
Qty: 15 ML | Refills: 5 | Status: SHIPPED | OUTPATIENT
Start: 2024-04-29

## 2024-04-29 RX ORDER — INSULIN HUMAN 4-8-12(60)
4-12 KIT INHALATION 3 TIMES DAILY
Qty: 180 EACH | Refills: 0 | Status: SHIPPED | OUTPATIENT
Start: 2024-04-29

## 2024-04-29 ASSESSMENT — FIBROSIS 4 INDEX: FIB4 SCORE: 0.79

## 2024-04-29 NOTE — PROGRESS NOTES
CHIEF COMPLAINT: Patient is here for follow up of Type 1 Diabetes Mellitus    HPI:     Marquise Peacock is a 33 y.o. male with Type 1 Diabetes Mellitus here for follow up.    Labs from 4/29/2024 HbA1c is 6.1%    He was diagnosed with type 1 diabetes at the age of 11.    He is on MDII   He prefers to keep his A1c low is much as possible  He has been wearing a Dexcom G7 CGM for over 6 months        Toujeo 44u daily  Novolog carb ratio 1:12 grams of carbs  Correction scale 1:50> 100    He prefers to keep his A1c low thus he doesn't like CSII  He denies SE with his meds  He reports that he liked the afrezza as it helps prevent lipohypertrophy and keeps his sugars better controlled due to its fast onset of action    Unfortunately Afrezza was denied by his insurance  I did sent the script for Afrezza to [facility] pharmacy but PA was not approved by insurance  We discussed reapplying for prior authorization again      I explained to him that Afrezza is medically necessary to achieve his goals of keeping his A1c down  He has lipohypertrophy from having been on shots for may years and this is why Afrezza is medically necessary  He does not have any chronic lung disease and is a non-smoker  His baseline FEV 1 was 3.81 and PEF was 771 on December 2023 which is normal            Download of his CGM shows an average sugar of 134 with time in range of 72%.  He has less than 10% lows                  He is taking generic Crestor 5 mg daily  He denies SE such as muscle aches and muscle weakness  He denies a hx of CAD and CVD     Latest Reference Range & Units 02/07/24 07:38   Cholesterol,Tot 100 - 199 mg/dL 180   Triglycerides 0 - 149 mg/dL 47   HDL >=40 mg/dL 75   LDL <100 mg/dL 96   Apolipoprotein-B 66 - 133 mg/dL 83   Lipoprotein (a) <=29 mg/dL 10       He does not have diabetic kidney disease  He does not have albuminuria   Latest Reference Range & Units 02/07/24 07:38   Micro Alb Creat Ratio 0 - 30 mg/g 18   Creatinine, Urine  "mg/dL 283.20   Microalbumin, Urine Random mg/dL 5.1       He had an eye exam on 12/2023 which showed no evidence of diabetic retinopathy      BG Diary:04/29/24  CGM was downloaded and reviewed    Weight has been stable    Diabetes Complications   Retinopathy: No known retinopathy.  Last eye exam: December 2023  Neuropathy: Denies paresthesias or numbness in hands or feet. Denies any foot wounds.  Exercise: Minimal.  Diet: Fair.  Patient's medications, allergies, and social histories were reviewed and updated as appropriate.    ROS:     CONS:     No fever, no chills   EYES:     No diplopia, no blurry vision   CV:           No chest pain, no palpitations   PULM:     No SOB, no cough, no hemoptysis.   GI:            No nausea, no vomiting, no diarrhea, no constipation   ENDO:     No polyuria, no polydipsia, no heat intolerance, no cold intolerance       Past Medical History:  Problem List:  2023-12: Long-term insulin use (HCC)  2021-09: Anxiety  2021-09: Attention deficit hyperactivity disorder (ADHD)  2019-10: Type 1 diabetes mellitus (HCC)      Past Surgical History:  History reviewed. No pertinent surgical history.     Allergies:  Patient has no known allergies.     Social History:  Social History     Tobacco Use    Smoking status: Never    Smokeless tobacco: Never   Vaping Use    Vaping Use: Never used   Substance Use Topics    Alcohol use: Yes    Drug use: Never        Family History:   family history is not on file.      PHYSICAL EXAM:   OBJECTIVE:  Vital signs: /62 (BP Location: Right arm, Patient Position: Sitting, BP Cuff Size: Adult)   Pulse 69   Ht 1.803 m (5' 11\")   Wt 90.3 kg (199 lb)   SpO2 98%   BMI 27.75 kg/m²   GENERAL: Well-developed, well-nourished in no apparent distress.   EYE:  No ocular asymmetry, PERRLA  HENT: Pink, moist mucous membranes.    NECK: No thyromegaly.   CARDIOVASCULAR:  No murmurs  LUNGS: Clear breath sounds  ABDOMEN: Soft, nontender he has multiple areas of " "lipohypertrophy noted on the abdominal region  EXTREMITIES: No clubbing, cyanosis, or edema.   NEUROLOGICAL: No gross focal motor abnormalities   LYMPH: No cervical adenopathy palpated.   SKIN: No rashes, lesions.     Labs:  Lab Results   Component Value Date/Time    HBA1C 6.1 (A) 04/29/2024 04:35 PM        Lab Results   Component Value Date/Time    WBC 5.1 02/07/2024 07:38 AM    RBC 5.26 02/07/2024 07:38 AM    HEMOGLOBIN 16.8 02/07/2024 07:38 AM    MCV 94.1 02/07/2024 07:38 AM    MCH 31.9 02/07/2024 07:38 AM    MCHC 33.9 02/07/2024 07:38 AM    RDW 41.0 02/07/2024 07:38 AM    MPV 11.2 02/07/2024 07:38 AM       Lab Results   Component Value Date/Time    SODIUM 140 02/07/2024 07:38 AM    POTASSIUM 4.4 02/07/2024 07:38 AM    CHLORIDE 103 02/07/2024 07:38 AM    CO2 21 02/07/2024 07:38 AM    ANION 16.0 02/07/2024 07:38 AM    GLUCOSE 23 (LL) 02/07/2024 07:38 AM    BUN 27 (H) 02/07/2024 07:38 AM    CREATININE 0.92 02/07/2024 07:38 AM    CALCIUM 9.7 02/07/2024 07:38 AM    ASTSGOT 31 02/07/2024 07:38 AM    ALTSGPT 19 02/07/2024 07:38 AM    TBILIRUBIN 0.6 02/07/2024 07:38 AM    ALBUMIN 5.0 (H) 02/07/2024 07:38 AM    TOTPROTEIN 8.3 (H) 02/07/2024 07:38 AM    GLOBULIN 3.3 02/07/2024 07:38 AM    AGRATIO 1.5 02/07/2024 07:38 AM       Lab Results   Component Value Date/Time    CHOLSTRLTOT 180 02/07/2024 0738    TRIGLYCERIDE 47 02/07/2024 0738    HDL 75 02/07/2024 0738    LDL 96 02/07/2024 0738       Lab Results   Component Value Date/Time    MALBCRT 18 02/07/2024 07:38 AM    MICROALBUR 5.1 02/07/2024 07:38 AM        Lab Results   Component Value Date/Time    TSHULTRASEN 1.990 02/07/2024 0738     No results found for: \"FREEDIR\"  No results found for: \"FREET3\"  No results found for: \"THYSTIMIG\"        ASSESSMENT/PLAN:     1. Type 1 diabetes mellitus without complication (HCC)  Well-controlled  Continue Toujeo 44 units daily  I am replacing Humalog or NovoLog with Lyumjev which is a faster acting rapid insulin analog   continue same " carb ratio of 1 unit for every 12 g  I explained to the patient the difference between Lyumjev or rapid Humalog versus Humalog and NovoLog  Continue same correction scale point for every 50 above 100    We will reapply for approval of Afrezza or inhaled insulin  Again it is medically necessary because he has lipohypertrophy on exam  He has type 1 diabetes with chronic lung disease  His FEV1 is properly documented    I will send the Afrezza prescription to [facility] in [place] and a sample of Afrezza to [facility]    Recommend follow-up in 6 months        2. Dyslipidemia  Controlled continue Crestor repeat fasting lipids in 12 months    3. Hypoglycemia  Stable continue using inhaled glucagon as needed for severe hypoglycemia    4. Vitamin D deficiency  Stable   Vitamin D labs were reviewed with patient  Continue current supplements  Continue monitoring levels       5. Long-term insulin use (HCC)  Patient is on long-term basal bolus therapy for type 1 diabetes        Return in about 6 months (around 10/29/2024).      Total time spent on day of service was over 60 minutes which included obtaining a detailed history and physical exam, ordering labs, coordinating care and scheduling future follow-up      This patient during there office visit today was started on a new medication.  Side effects of the new medication were discussed with the patient today in the office.     Thank you kindly for allowing me to participate in the diabetes care plan for this patient.    Santosh Pitt MD, [facility], [facility]  04/29/24    CC:   Mahesh Fitzgerald M.D.

## 2024-04-30 ENCOUNTER — TELEPHONE (OUTPATIENT)
Dept: ENDOCRINOLOGY | Facility: MEDICAL CENTER | Age: 34
End: 2024-04-30
Payer: COMMERCIAL

## 2024-05-09 DIAGNOSIS — E10.9 TYPE 1 DIABETES MELLITUS WITHOUT COMPLICATION (HCC): ICD-10-CM

## 2024-05-09 RX ORDER — INSULIN HUMAN 4 UNIT(90)
4-8 KIT INHALATION 3 TIMES DAILY
Qty: 180 EACH | Refills: 11 | Status: SHIPPED | OUTPATIENT
Start: 2024-05-09

## 2024-06-07 DIAGNOSIS — F90.9 ATTENTION DEFICIT HYPERACTIVITY DISORDER (ADHD), UNSPECIFIED ADHD TYPE: ICD-10-CM

## 2024-06-07 NOTE — TELEPHONE ENCOUNTER
Received request via: Pharmacy    Was the patient seen in the last year in this department? Yes  10/20/23  Does the patient have an active prescription (recently filled or refills available) for medication(s) requested? No    [facility]    Does the patient have [facility] Plus and need 100 day supply (blood pressure, diabetes and cholesterol meds only)? Medication is not for cholesterol, blood pressure or diabetes

## 2024-06-11 RX ORDER — DEXTROAMPHETAMINE SACCHARATE, AMPHETAMINE ASPARTATE MONOHYDRATE, DEXTROAMPHETAMINE SULFATE AND AMPHETAMINE SULFATE 3.75; 3.75; 3.75; 3.75 MG/1; MG/1; MG/1; MG/1
CAPSULE, EXTENDED RELEASE ORAL
Qty: 30 CAPSULE | Refills: 0 | Status: SHIPPED | OUTPATIENT
Start: 2024-06-11 | End: 2024-06-20 | Stop reason: SDUPTHER

## 2024-06-20 ENCOUNTER — OFFICE VISIT (OUTPATIENT)
Dept: MEDICAL GROUP | Facility: LAB | Age: 34
End: 2024-06-20
Payer: COMMERCIAL

## 2024-06-20 VITALS
DIASTOLIC BLOOD PRESSURE: 64 MMHG | TEMPERATURE: 97.4 F | HEIGHT: 71 IN | OXYGEN SATURATION: 97 % | WEIGHT: 201 LBS | HEART RATE: 77 BPM | SYSTOLIC BLOOD PRESSURE: 114 MMHG | BODY MASS INDEX: 28.14 KG/M2 | RESPIRATION RATE: 20 BRPM

## 2024-06-20 DIAGNOSIS — F90.9 ATTENTION DEFICIT HYPERACTIVITY DISORDER (ADHD), UNSPECIFIED ADHD TYPE: ICD-10-CM

## 2024-06-20 DIAGNOSIS — S29.9XXA TRAUMATIC INJURY OF RIB: ICD-10-CM

## 2024-06-20 DIAGNOSIS — N50.812 TESTICULAR PAIN, LEFT: ICD-10-CM

## 2024-06-20 PROCEDURE — 3078F DIAST BP <80 MM HG: CPT | Performed by: FAMILY MEDICINE

## 2024-06-20 PROCEDURE — 3074F SYST BP LT 130 MM HG: CPT | Performed by: FAMILY MEDICINE

## 2024-06-20 PROCEDURE — 99214 OFFICE O/P EST MOD 30 MIN: CPT | Performed by: FAMILY MEDICINE

## 2024-06-20 RX ORDER — DEXTROAMPHETAMINE SACCHARATE, AMPHETAMINE ASPARTATE MONOHYDRATE, DEXTROAMPHETAMINE SULFATE AND AMPHETAMINE SULFATE 3.75; 3.75; 3.75; 3.75 MG/1; MG/1; MG/1; MG/1
15 CAPSULE, EXTENDED RELEASE ORAL EVERY MORNING
Qty: 30 CAPSULE | Refills: 0 | Status: SHIPPED | OUTPATIENT
Start: 2024-09-09 | End: 2024-10-09

## 2024-06-20 RX ORDER — DEXTROAMPHETAMINE SACCHARATE, AMPHETAMINE ASPARTATE MONOHYDRATE, DEXTROAMPHETAMINE SULFATE AND AMPHETAMINE SULFATE 3.75; 3.75; 3.75; 3.75 MG/1; MG/1; MG/1; MG/1
15 CAPSULE, EXTENDED RELEASE ORAL EVERY MORNING
Qty: 30 CAPSULE | Refills: 0 | Status: SHIPPED | OUTPATIENT
Start: 2024-07-10 | End: 2024-08-09

## 2024-06-20 RX ORDER — DEXTROAMPHETAMINE SACCHARATE, AMPHETAMINE ASPARTATE MONOHYDRATE, DEXTROAMPHETAMINE SULFATE AND AMPHETAMINE SULFATE 3.75; 3.75; 3.75; 3.75 MG/1; MG/1; MG/1; MG/1
15 CAPSULE, EXTENDED RELEASE ORAL EVERY MORNING
Qty: 30 CAPSULE | Refills: 0 | Status: SHIPPED | OUTPATIENT
Start: 2024-08-09 | End: 2024-09-08

## 2024-06-20 ASSESSMENT — FIBROSIS 4 INDEX: FIB4 SCORE: 0.81

## 2024-06-20 ASSESSMENT — PATIENT HEALTH QUESTIONNAIRE - PHQ9: CLINICAL INTERPRETATION OF PHQ2 SCORE: 0

## 2024-06-20 NOTE — PROGRESS NOTES
"Subjective:     CC: Follow up, testicular pain, rib pain    HPI:   Marquise presents today for follow-up and to discuss testicular pain and rib pain.    He had 2 to 3 days of left testicle pain and sensitivity without triggering event or trauma.  This did resolve 2 days ago.  Did not note any specific mass or lump and no current symptoms.  No dysuria.    He hit his left anterior ribs with a fall 4 days ago.  Has had some persistent pain over the left costal margin and slightly superior since then.  Pain is worsened with deep breaths.  No trouble breathing.    ADHD continues to be well-controlled on Adderall XR 15 mg daily.  Does not take this every day.  Denies significant side effects.    Medications, past medical history, allergies, and social history have been reviewed and updated.      Objective:       Exam:  /64 (BP Location: Right arm, Patient Position: Sitting, BP Cuff Size: Adult)   Pulse 77   Temp 36.3 °C (97.4 °F) (Temporal)   Resp 20   Ht 1.803 m (5' 11\")   Wt 91.2 kg (201 lb)   SpO2 97%   BMI 28.03 kg/m²  Body mass index is 28.03 kg/m².    Constitutional: Alert. Well appearing. No distress.  Skin: Warm, dry, good turgor, no visible rashes.  ENMT: Moist mucous membranes. Normal dentition.  Respiratory: Normal effort. Lungs are clear to auscultation bilaterally.  Chest: Tenderness over the left anterior chest wall just above the left costal margin without obvious deformity.  Cardiovascular: Regular rate and rhythm.   Neuro: Moves all four extremities. No facial droop.  Psych: Answers questions appropriately. Normal affect and mood.      Assessment & Plan:     34 y.o. male with the following -     1. Attention deficit hyperactivity disorder (ADHD), unspecified ADHD type  Chronic and stable.  PDMP reviewed.  Refills provided.  Contract signed and will plan to collect UDS.  - URINE DRUG SCREEN W/CONF (SEND OUT); Future  - Controlled Substance Treatment Agreement  - amphetamine-dextroamphetamine " (ADDERALL XR) 15 MG XR capsule; Take 1 Capsule by mouth every morning for 30 days.  Dispense: 30 Capsule; Refill: 0  - amphetamine-dextroamphetamine (ADDERALL XR) 15 MG XR capsule; Take 1 Capsule by mouth every morning for 30 days.  Dispense: 30 Capsule; Refill: 0  - amphetamine-dextroamphetamine (ADDERALL XR) 15 MG XR capsule; Take 1 Capsule by mouth every morning for 30 days.  Dispense: 30 Capsule; Refill: 0    2. Traumatic injury of rib  Fall with left anterior rib injury.  X-ray to evaluate for fracture.  No signs on exam or vitals of respiratory distress, hemo or pneumothorax.  - [id number] (WITH 1-VIEW CXR) LEFT; Future    3. Testicular pain, left  Episode of pain that resolved 2 days ago.  Exam deferred today but he denies continued pain, any abnormality with self exams.  Will hold on evaluation at this point given currently resolved symptoms but consider ultrasound if symptoms recur.       Please note that this note was created using voice recognition software.

## 2024-07-18 ENCOUNTER — APPOINTMENT (OUTPATIENT)
Dept: RADIOLOGY | Facility: MEDICAL CENTER | Age: 34
End: 2024-07-18
Attending: FAMILY MEDICINE
Payer: COMMERCIAL

## 2024-07-18 DIAGNOSIS — S29.9XXA TRAUMATIC INJURY OF RIB: ICD-10-CM

## 2024-07-18 PROCEDURE — 71101 X-RAY EXAM UNILAT RIBS/CHEST: CPT | Mod: LT

## 2024-10-24 DIAGNOSIS — E10.9 TYPE 1 DIABETES MELLITUS WITHOUT COMPLICATION (HCC): ICD-10-CM

## 2024-10-24 RX ORDER — INSULIN GLARGINE 100 [IU]/ML
INJECTION, SOLUTION SUBCUTANEOUS
Qty: 15 ML | Refills: 4 | Status: SHIPPED | OUTPATIENT
Start: 2024-10-24

## 2024-10-24 RX ORDER — INSULIN ASPART 100 [IU]/ML
INJECTION, SOLUTION INTRAVENOUS; SUBCUTANEOUS
Qty: 15 ML | Refills: 4 | Status: SHIPPED | OUTPATIENT
Start: 2024-10-24

## 2024-11-06 ENCOUNTER — OFFICE VISIT (OUTPATIENT)
Dept: ENDOCRINOLOGY | Facility: MEDICAL CENTER | Age: 34
End: 2024-11-06
Attending: INTERNAL MEDICINE
Payer: COMMERCIAL

## 2024-11-06 VITALS
WEIGHT: 194 LBS | SYSTOLIC BLOOD PRESSURE: 132 MMHG | OXYGEN SATURATION: 98 % | DIASTOLIC BLOOD PRESSURE: 70 MMHG | HEIGHT: 71 IN | BODY MASS INDEX: 27.16 KG/M2 | HEART RATE: 77 BPM

## 2024-11-06 DIAGNOSIS — E10.9 TYPE 1 DIABETES MELLITUS WITHOUT COMPLICATION (HCC): ICD-10-CM

## 2024-11-06 DIAGNOSIS — Z79.4 LONG-TERM INSULIN USE (HCC): ICD-10-CM

## 2024-11-06 DIAGNOSIS — E78.5 DYSLIPIDEMIA: ICD-10-CM

## 2024-11-06 DIAGNOSIS — E55.9 VITAMIN D DEFICIENCY: ICD-10-CM

## 2024-11-06 LAB
HBA1C MFR BLD: 5.9 % (ref ?–5.8)
POCT INT CON NEG: NEGATIVE
POCT INT CON POS: POSITIVE

## 2024-11-06 PROCEDURE — 99212 OFFICE O/P EST SF 10 MIN: CPT | Performed by: INTERNAL MEDICINE

## 2024-11-06 PROCEDURE — 3075F SYST BP GE 130 - 139MM HG: CPT | Performed by: INTERNAL MEDICINE

## 2024-11-06 PROCEDURE — 99214 OFFICE O/P EST MOD 30 MIN: CPT | Performed by: INTERNAL MEDICINE

## 2024-11-06 PROCEDURE — 95251 CONT GLUC MNTR ANALYSIS I&R: CPT | Performed by: INTERNAL MEDICINE

## 2024-11-06 PROCEDURE — 83036 HEMOGLOBIN GLYCOSYLATED A1C: CPT | Performed by: INTERNAL MEDICINE

## 2024-11-06 PROCEDURE — 95249 CONT GLUC MNTR PT PROV EQP: CPT | Performed by: INTERNAL MEDICINE

## 2024-11-06 PROCEDURE — 3078F DIAST BP <80 MM HG: CPT | Performed by: INTERNAL MEDICINE

## 2024-11-06 RX ORDER — INSULIN PMP CART,AUT,G6/7,CNTR
1 EACH SUBCUTANEOUS
Qty: 10 EACH | Refills: 11 | Status: SHIPPED | OUTPATIENT
Start: 2024-11-06 | End: 2024-11-14 | Stop reason: SDUPTHER

## 2024-11-06 RX ORDER — INSULIN PMP CART,AUT,G6/7,CNTR
1 EACH SUBCUTANEOUS ONCE
Qty: 1 KIT | Refills: 0 | Status: SHIPPED | OUTPATIENT
Start: 2024-11-06 | End: 2024-11-12

## 2024-11-06 ASSESSMENT — FIBROSIS 4 INDEX: FIB4 SCORE: 0.81

## 2024-11-06 NOTE — PROGRESS NOTES
CHIEF COMPLAINT: Patient is here for follow up of Type 1 Diabetes Mellitus    HPI:     Marquise Peacock is a 34 y.o. male with Type 1 Diabetes Mellitus here for follow up.    Labs from 11/6/2024 hba1c is 5.9%  Labs from 4/29/2024 HbA1c is 6.1%    He was diagnosed with type 1 diabetes at the age of 11.    He is on MDII   He prefers to keep his A1c low is much as possible  He has been wearing a Dexcom G7 CGM for over 6 months  He prefers to keep his A1c low thus he doesn't like CSII (except for Omnipod)      Lantus 38-45u daily  Novolog or Lyumjev carb ratio 1:12 grams of carbs  Correction scale 1:50> 100      He denies SE with his meds  He reports that he liked the afrezza was denied by his insurance on [place]   He tried an Omnipod trial and didn't like it but would like to try it again and use Omnipod 5 with G7        Afrezza is medically necessary to achieve his goals of keeping his A1c down  He has lipohypertrophy from having been on shots for may years and this is why Afrezza is medically necessary  He does not have any chronic lung disease and is a non-smoker  His baseline FEV 1 was 3.81 and PEF was 771 on December 2023 which is normal            Download of his CGM shows                   He is taking generic Crestor 5 mg daily  He denies SE such as muscle aches and muscle weakness  He denies a hx of CAD and CVD   Latest Reference Range & Units 02/07/24 07:38   Cholesterol,Tot 100 - 199 mg/dL 180   Triglycerides 0 - 149 mg/dL 47   HDL >=40 mg/dL 75   LDL <100 mg/dL 96   Apolipoprotein-B 66 - 133 mg/dL 83   Lipoprotein (a) <=29 mg/dL 10       He does not have diabetic kidney disease  He does not have albuminuria   Latest Reference Range & Units 02/07/24 07:38   Micro Alb Creat Ratio 0 - 30 mg/g 18   Creatinine, Urine mg/dL 283.20   Microalbumin, Urine Random mg/dL 5.1       He had an eye exam on 12/2023 which showed no evidence of diabetic retinopathy      BG Diary:  CGM was downloaded and  "reviewed    Weight has been stable    Diabetes Complications   Retinopathy: No known retinopathy.  Last eye exam: December 2023  Neuropathy: Denies paresthesias or numbness in hands or feet. Denies any foot wounds.  Exercise: Minimal.  Diet: Fair.  Patient's medications, allergies, and social histories were reviewed and updated as appropriate.    ROS:     CONS:     No fever, no chills   EYES:     No diplopia, no blurry vision   CV:           No chest pain, no palpitations   PULM:     No SOB, no cough, no hemoptysis.   GI:            No nausea, no vomiting, no diarrhea, no constipation   ENDO:     No polyuria, no polydipsia, no heat intolerance, no cold intolerance       Past Medical History:  Problem List:  2024-04: Dyslipidemia  2023-12: Long-term insulin use (HCC)  2021-09: Anxiety  2021-09: Attention deficit hyperactivity disorder (ADHD)  2019-10: Type 1 diabetes mellitus (HCC)      Past Surgical History:  History reviewed. No pertinent surgical history.     Allergies:  Patient has no known allergies.     Social History:  Social History     Tobacco Use    Smoking status: Never    Smokeless tobacco: Never   Vaping Use    Vaping status: Never Used   Substance Use Topics    Alcohol use: Yes    Drug use: Never        Family History:   family history is not on file.      PHYSICAL EXAM:   OBJECTIVE:  Vital signs: /70 (BP Location: Left arm, Patient Position: Sitting, BP Cuff Size: Adult long)   Pulse 77   Ht 1.803 m (5' 11\")   Wt 88 kg (194 lb)   SpO2 98%   BMI 27.06 kg/m²   GENERAL: Well-developed, well-nourished in no apparent distress.   EYE:  No ocular asymmetry, PERRLA  HENT: Pink, moist mucous membranes.    NECK: No thyromegaly.   CARDIOVASCULAR:  No murmurs  LUNGS: Clear breath sounds  ABDOMEN: Soft, nontender he has multiple areas of lipohypertrophy noted on the abdominal region  EXTREMITIES: No clubbing, cyanosis, or edema.   NEUROLOGICAL: No gross focal motor abnormalities   LYMPH: No cervical " "adenopathy palpated.   SKIN: No rashes, lesions.     Labs:  Lab Results   Component Value Date/Time    HBA1C 6.1 (A) 04/29/2024 04:35 PM        Lab Results   Component Value Date/Time    WBC 5.1 02/07/2024 07:38 AM    RBC 5.26 02/07/2024 07:38 AM    HEMOGLOBIN 16.8 02/07/2024 07:38 AM    MCV 94.1 02/07/2024 07:38 AM    MCH 31.9 02/07/2024 07:38 AM    MCHC 33.9 02/07/2024 07:38 AM    RDW 41.0 02/07/2024 07:38 AM    MPV 11.2 02/07/2024 07:38 AM       Lab Results   Component Value Date/Time    SODIUM 140 02/07/2024 07:38 AM    POTASSIUM 4.4 02/07/2024 07:38 AM    CHLORIDE 103 02/07/2024 07:38 AM    CO2 21 02/07/2024 07:38 AM    ANION 16.0 02/07/2024 07:38 AM    GLUCOSE 23 (LL) 02/07/2024 07:38 AM    BUN 27 (H) 02/07/2024 07:38 AM    CREATININE 0.92 02/07/2024 07:38 AM    CALCIUM 9.7 02/07/2024 07:38 AM    ASTSGOT 31 02/07/2024 07:38 AM    ALTSGPT 19 02/07/2024 07:38 AM    TBILIRUBIN 0.6 02/07/2024 07:38 AM    ALBUMIN 5.0 (H) 02/07/2024 07:38 AM    TOTPROTEIN 8.3 (H) 02/07/2024 07:38 AM    GLOBULIN 3.3 02/07/2024 07:38 AM    AGRATIO 1.5 02/07/2024 07:38 AM       Lab Results   Component Value Date/Time    CHOLSTRLTOT 180 02/07/2024 0738    TRIGLYCERIDE 47 02/07/2024 0738    HDL 75 02/07/2024 0738    LDL 96 02/07/2024 0738       Lab Results   Component Value Date/Time    MALBCRT 18 02/07/2024 07:38 AM    MICROALBUR 5.1 02/07/2024 07:38 AM        Lab Results   Component Value Date/Time    TSHULTRASEN 1.990 02/07/2024 0738     No results found for: \"FREEDIR\"  No results found for: \"FREET3\"  No results found for: \"THYSTIMIG\"        ASSESSMENT/PLAN:     1. Type 1 diabetes mellitus without complication (HCC)  Well-controlled  Continue Lantus 44 units daily  Continue  NovoLog or Lyumjev   continue same carb ratio of 1 unit for every 12 g  I explained to the patient the difference between Lyumjev versus Humalog and NovoLog  Continue same correction scale 1u for every 50 above 100    Will not order Omnipod G7  Recommend follow-up " in 3 months [facility]        2. Dyslipidemia  Controlled continue Crestor repeat fasting lipids in 3 months    3. Hypoglycemia  Stable continue using inhaled glucagon as needed for severe hypoglycemia    4. Vitamin D deficiency  Stable   Vitamin D labs were reviewed with patient  Continue current supplements  Continue monitoring levels       5. Long-term insulin use (HCC)  Patient is on long-term basal bolus therapy for type 1 diabetes        Return in about 3 months (around 2/6/2025).      This patient during there office visit today was started on a new medication.  Side effects of the new medication were discussed with the patient today in the office.     Thank you kindly for allowing me to participate in the diabetes care plan for this patient.    Santosh Pitt MD, [facility], [facility]  04/29/24    CC:   Mahesh Fitzgerald M.D.

## 2024-11-14 DIAGNOSIS — E10.9 TYPE 1 DIABETES MELLITUS WITHOUT COMPLICATION (HCC): ICD-10-CM

## 2024-11-17 RX ORDER — INSULIN PMP CART,AUT,G6/7,CNTR
1 EACH SUBCUTANEOUS
Qty: 10 EACH | Refills: 11 | Status: SHIPPED | OUTPATIENT
Start: 2024-11-17 | End: 2024-11-18 | Stop reason: SDUPTHER

## 2024-11-18 DIAGNOSIS — E10.9 TYPE 1 DIABETES MELLITUS WITHOUT COMPLICATION (HCC): ICD-10-CM

## 2024-11-20 RX ORDER — INSULIN PMP CART,AUT,G6/7,CNTR
1 EACH SUBCUTANEOUS
Qty: 10 EACH | Refills: 11 | Status: SHIPPED | OUTPATIENT
Start: 2024-11-20 | End: 2024-11-21 | Stop reason: SDUPTHER

## 2024-11-21 DIAGNOSIS — E10.9 TYPE 1 DIABETES MELLITUS WITHOUT COMPLICATION (HCC): ICD-10-CM

## 2024-11-25 RX ORDER — INSULIN PMP CART,AUT,G6/7,CNTR
1 EACH SUBCUTANEOUS
Qty: 10 EACH | Refills: 11 | Status: SHIPPED | OUTPATIENT
Start: 2024-11-25

## 2024-12-27 ENCOUNTER — PATIENT MESSAGE (OUTPATIENT)
Dept: MEDICAL GROUP | Facility: LAB | Age: 34
End: 2024-12-27
Payer: COMMERCIAL

## 2025-04-07 DIAGNOSIS — E78.00 ELEVATED LDL CHOLESTEROL LEVEL: ICD-10-CM

## 2025-04-07 DIAGNOSIS — E10.9 TYPE 1 DIABETES MELLITUS WITHOUT COMPLICATION (HCC): ICD-10-CM

## 2025-04-08 RX ORDER — ROSUVASTATIN CALCIUM 5 MG/1
5 TABLET, COATED ORAL EVERY EVENING
Qty: 90 TABLET | Refills: 3 | Status: SHIPPED | OUTPATIENT
Start: 2025-04-08

## 2025-05-27 ENCOUNTER — PATIENT MESSAGE (OUTPATIENT)
Dept: MEDICAL GROUP | Facility: LAB | Age: 35
End: 2025-05-27
Payer: COMMERCIAL

## 2025-05-27 DIAGNOSIS — E10.9 TYPE 1 DIABETES MELLITUS WITHOUT COMPLICATION (HCC): ICD-10-CM

## 2025-05-27 RX ORDER — INSULIN GLARGINE 100 [IU]/ML
70 INJECTION, SOLUTION SUBCUTANEOUS EVERY EVENING
Qty: 30 ML | Refills: 3 | Status: SHIPPED | OUTPATIENT
Start: 2025-05-27

## 2025-06-03 DIAGNOSIS — E10.9 TYPE 1 DIABETES MELLITUS WITHOUT COMPLICATION (HCC): ICD-10-CM

## 2025-06-03 RX ORDER — INSULIN PMP CART,AUT,G6/7,CNTR
1 EACH SUBCUTANEOUS
Qty: 10 EACH | Refills: 11 | Status: SHIPPED | OUTPATIENT
Start: 2025-06-03

## 2025-06-03 NOTE — TELEPHONE ENCOUNTER
Received request via: Pharmacy    Was the patient seen in the last year in this department? Yes    Does the patient have an active prescription (recently filled or refills available) for medication(s) requested? No    Pharmacy Name: [facility]     Does the patient have [facility] and need 100-day supply? (This applies to ALL medications) Patient does not have SCP

## 2025-07-15 ENCOUNTER — PATIENT MESSAGE (OUTPATIENT)
Dept: MEDICAL GROUP | Facility: MEDICAL CENTER | Age: 35
End: 2025-07-15

## 2025-07-15 ENCOUNTER — PATIENT MESSAGE (OUTPATIENT)
Dept: MEDICAL GROUP | Facility: MEDICAL CENTER | Age: 35
End: 2025-07-15
Payer: COMMERCIAL

## 2025-07-15 DIAGNOSIS — E10.9 TYPE 1 DIABETES MELLITUS WITHOUT COMPLICATION (HCC): Primary | ICD-10-CM

## 2025-07-15 RX ORDER — INSULIN ASPART 100 [IU]/ML
INJECTION, SOLUTION INTRAVENOUS; SUBCUTANEOUS
Qty: 90 ML | Refills: 1 | Status: SHIPPED | OUTPATIENT
Start: 2025-07-15

## 2025-07-23 ENCOUNTER — RX ONLY (RX ONLY)
Age: 35
End: 2025-07-23

## 2025-07-23 RX ORDER — FLUOCINONIDE 0.5 MG/G
SMALL AMOUNT CREAM TOPICAL EVERY 12 HOURS
Qty: 30 | Refills: 0 | Status: ERX | COMMUNITY
Start: 2025-07-23

## 2025-07-23 RX ORDER — MUPIROCIN 2 %
SMALL AMOUNT OINTMENT (GRAM) TOPICAL EVERY 6 HOURS
Qty: 15 | Refills: 0 | Status: ERX | COMMUNITY
Start: 2025-07-23

## 2025-07-24 ENCOUNTER — PATIENT MESSAGE (OUTPATIENT)
Dept: ENDOCRINOLOGY | Facility: MEDICAL CENTER | Age: 35
End: 2025-07-24
Payer: COMMERCIAL

## 2025-07-26 DIAGNOSIS — E10.9 TYPE 1 DIABETES MELLITUS WITHOUT COMPLICATION (HCC): Primary | ICD-10-CM

## 2025-07-26 RX ORDER — INSULIN ASPART 100 [IU]/ML
INJECTION, SOLUTION INTRAVENOUS; SUBCUTANEOUS
Qty: 100 ML | Refills: 0 | Status: SHIPPED | OUTPATIENT
Start: 2025-07-26

## 2025-07-26 RX ORDER — INSULIN ASPART 100 [IU]/ML
15 INJECTION, SOLUTION INTRAVENOUS; SUBCUTANEOUS
Qty: 9 ML | Refills: 0 | Status: SHIPPED | OUTPATIENT
Start: 2025-07-26

## 2025-08-20 RX ORDER — ACYCLOVIR 400 MG/1
TABLET ORAL
Qty: 9 EACH | Refills: 0 | Status: SHIPPED | OUTPATIENT
Start: 2025-08-20